# Patient Record
Sex: MALE | Race: BLACK OR AFRICAN AMERICAN | NOT HISPANIC OR LATINO | Employment: OTHER | URBAN - METROPOLITAN AREA
[De-identification: names, ages, dates, MRNs, and addresses within clinical notes are randomized per-mention and may not be internally consistent; named-entity substitution may affect disease eponyms.]

---

## 2023-03-12 ENCOUNTER — HOSPITAL ENCOUNTER (EMERGENCY)
Facility: HOSPITAL | Age: 63
Discharge: HOME | End: 2023-03-12
Attending: EMERGENCY MEDICINE | Admitting: EMERGENCY MEDICINE
Payer: MEDICARE

## 2023-03-12 ENCOUNTER — APPOINTMENT (OUTPATIENT)
Dept: CT IMAGING | Facility: HOSPITAL | Age: 63
End: 2023-03-12
Payer: MEDICARE

## 2023-03-12 VITALS
BODY MASS INDEX: 32.14 KG/M2 | RESPIRATION RATE: 18 BRPM | TEMPERATURE: 97 F | HEIGHT: 66 IN | DIASTOLIC BLOOD PRESSURE: 103 MMHG | WEIGHT: 200 LBS | SYSTOLIC BLOOD PRESSURE: 143 MMHG | OXYGEN SATURATION: 100 % | HEART RATE: 90 BPM

## 2023-03-12 DIAGNOSIS — N18.9 CHRONIC KIDNEY DISEASE, UNSPECIFIED CKD STAGE: ICD-10-CM
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
## 2023-03-12 DIAGNOSIS — S09.90XA INJURY OF HEAD, INITIAL ENCOUNTER: Primary | ICD-10-CM

## 2023-03-12 LAB
ALBUMIN SERPL-MCNC: 3.9 G/DL (ref 3.5–5.2)
ALBUMIN/GLOB SERPL: 1.2 G/DL
ALP SERPL-CCNC: 234 U/L (ref 39–117)
ALT SERPL W P-5'-P-CCNC: 105 U/L (ref 1–41)
ANION GAP SERPL CALCULATED.3IONS-SCNC: 15 MMOL/L (ref 5–15)
APTT PPP: 30.3 SECONDS (ref 22.7–35.4)
AST SERPL-CCNC: 104 U/L (ref 1–40)
BASOPHILS # BLD AUTO: 0.04 10*3/MM3 (ref 0–0.2)
BASOPHILS NFR BLD AUTO: 0.7 % (ref 0–1.5)
BILIRUB SERPL-MCNC: 0.8 MG/DL (ref 0–1.2)
BUN SERPL-MCNC: 49 MG/DL (ref 8–23)
BUN/CREAT SERPL: 12.8 (ref 7–25)
CALCIUM SPEC-SCNC: 9.6 MG/DL (ref 8.6–10.5)
CHLORIDE SERPL-SCNC: 104 MMOL/L (ref 98–107)
CO2 SERPL-SCNC: 19 MMOL/L (ref 22–29)
CREAT SERPL-MCNC: 3.84 MG/DL (ref 0.76–1.27)
DEPRECATED RDW RBC AUTO: 53.4 FL (ref 37–54)
EGFRCR SERPLBLD CKD-EPI 2021: 16.6 ML/MIN/1.73
EOSINOPHIL # BLD AUTO: 0.02 10*3/MM3 (ref 0–0.4)
EOSINOPHIL NFR BLD AUTO: 0.3 % (ref 0.3–6.2)
ERYTHROCYTE [DISTWIDTH] IN BLOOD BY AUTOMATED COUNT: 15.8 % (ref 12.3–15.4)
GLOBULIN UR ELPH-MCNC: 3.2 GM/DL
GLUCOSE BLDC GLUCOMTR-MCNC: 112 MG/DL (ref 70–130)
GLUCOSE SERPL-MCNC: 109 MG/DL (ref 65–99)
HCT VFR BLD AUTO: 41.6 % (ref 37.5–51)
HGB BLD-MCNC: 13.3 G/DL (ref 13–17.7)
IMM GRANULOCYTES # BLD AUTO: 0.02 10*3/MM3 (ref 0–0.05)
IMM GRANULOCYTES NFR BLD AUTO: 0.3 % (ref 0–0.5)
INR PPP: 2.09 (ref 0.9–1.1)
LYMPHOCYTES # BLD AUTO: 1.03 10*3/MM3 (ref 0.7–3.1)
LYMPHOCYTES NFR BLD AUTO: 17.3 % (ref 19.6–45.3)
MCH RBC QN AUTO: 29.9 PG (ref 26.6–33)
MCHC RBC AUTO-ENTMCNC: 32 G/DL (ref 31.5–35.7)
MCV RBC AUTO: 93.5 FL (ref 79–97)
MONOCYTES # BLD AUTO: 0.35 10*3/MM3 (ref 0.1–0.9)
MONOCYTES NFR BLD AUTO: 5.9 % (ref 5–12)
NEUTROPHILS NFR BLD AUTO: 4.5 10*3/MM3 (ref 1.7–7)
NEUTROPHILS NFR BLD AUTO: 75.5 % (ref 42.7–76)
NRBC BLD AUTO-RTO: 0.3 /100 WBC (ref 0–0.2)
PLATELET # BLD AUTO: 222 10*3/MM3 (ref 140–450)
PMV BLD AUTO: 10.5 FL (ref 6–12)
POTASSIUM SERPL-SCNC: 5.2 MMOL/L (ref 3.5–5.2)
PROT SERPL-MCNC: 7.1 G/DL (ref 6–8.5)
PROTHROMBIN TIME: 23.7 SECONDS (ref 11.7–14.2)
RBC # BLD AUTO: 4.45 10*6/MM3 (ref 4.14–5.8)
SODIUM SERPL-SCNC: 138 MMOL/L (ref 136–145)
WBC NRBC COR # BLD: 5.96 10*3/MM3 (ref 3.4–10.8)

## 2023-03-12 PROCEDURE — 99283 EMERGENCY DEPT VISIT LOW MDM: CPT

## 2023-03-12 PROCEDURE — 85025 COMPLETE CBC W/AUTO DIFF WBC: CPT | Performed by: EMERGENCY MEDICINE

## 2023-03-12 PROCEDURE — 70450 CT HEAD/BRAIN W/O DYE: CPT

## 2023-03-12 PROCEDURE — 82962 GLUCOSE BLOOD TEST: CPT

## 2023-03-12 PROCEDURE — 72125 CT NECK SPINE W/O DYE: CPT

## 2023-03-12 PROCEDURE — 36415 COLL VENOUS BLD VENIPUNCTURE: CPT

## 2023-03-12 PROCEDURE — 80053 COMPREHEN METABOLIC PANEL: CPT | Performed by: EMERGENCY MEDICINE

## 2023-03-12 PROCEDURE — 85610 PROTHROMBIN TIME: CPT | Performed by: EMERGENCY MEDICINE

## 2023-03-12 PROCEDURE — 85730 THROMBOPLASTIN TIME PARTIAL: CPT | Performed by: EMERGENCY MEDICINE

## 2023-03-12 NOTE — ED TRIAGE NOTES
Pt from [facility] post acute for fall with head injury. Pt slid out of his be and hit his head on his chair on the right side. Now c/o of HA. Pt on blood thinners, no LOC.     Pt and RN wearing mask upon triage.

## 2023-03-12 NOTE — ED PROVIDER NOTES
EMERGENCY DEPARTMENT ENCOUNTER    Room Number:  33/33  Date seen:  3/12/2023  PCP: Rigo De Los Santos MD  Historian: Patient, paramedics      HPI:  Chief Complaint: Accidental fall with head injury and some confusion  A complete HPI/ROS/PMH/PSH/SH/FH are unobtainable due to: N/A  Context: Seth Marie is a 63 y.o. male who presents to the ED via EMS from nursing home facility for evaluation of accidental fall from bed with head injury.  Patient tells me that he was reaching for a remote control when he excellently fell out of bed and struck his head against the ground and chair.  He does not think he had loss of consciousness.  He complains of some pain along the left-sided head area.  Patient is reportedly on an anticoagulant but it is not clear which one right now.  Paramedics also report patient had some signs of confusion as reported by the nursing home staff.  Patient denies any other areas of injury or concern to me.        PAST MEDICAL HISTORY  Active Ambulatory Problems     Diagnosis Date Noted   • No Active Ambulatory Problems     Resolved Ambulatory Problems     Diagnosis Date Noted   • No Resolved Ambulatory Problems     No Additional Past Medical History         PAST SURGICAL HISTORY  History reviewed. No pertinent surgical history.      FAMILY HISTORY  History reviewed. No pertinent family history.      SOCIAL HISTORY  Social History     Socioeconomic History   • Marital status: Single         ALLERGIES  Pork-derived products and Protonix [pantoprazole]        REVIEW OF SYSTEMS  Review of Systems   Constitutional: Negative for activity change and fever.   Eyes: Negative for pain and visual disturbance.   Respiratory: Negative for cough and shortness of breath.    Cardiovascular: Negative for chest pain.   Gastrointestinal: Negative for abdominal pain.   Genitourinary: Negative for dysuria.   Skin: Negative for color change.   Neurological: Positive for headaches. Negative for syncope.    Psychiatric/Behavioral: Positive for confusion.   All other systems reviewed and are negative.           PHYSICAL EXAM  ED Triage Vitals   Temp Heart Rate Resp BP SpO2   03/12/23 1548 03/12/23 1548 03/12/23 1548 03/12/23 1548 03/12/23 1548   97 °F (36.1 °C) 96 18 134/100 98 %      Temp src Heart Rate Source Patient Position BP Location FiO2 (%)   -- -- 03/12/23 1610 03/12/23 1610 --     Lying Left arm        Physical Exam      GENERAL: Pleasant, older gentleman, calm, no acute distress  HENT: nares patent, normocephalic.  No obvious scalp hematoma evident and certainly no scalp laceration.  There is some mild tenderness to the left parietal scalp and also to the right parietal scalp to a lesser degree.  EYES: no scleral icterus, normal conjunctiva  CV: regular rhythm, normal rate, normal pulses  RESPIRATORY: normal effort, no stridor  ABDOMEN: soft, nontender throughout  MUSCULOSKELETAL: no deformity, no sign of injury to the extremities  NEURO: alert, moves all extremities, follows commands  PSYCH:  calm, cooperative  SKIN: warm, dry    Vital signs and nursing notes reviewed.          LAB RESULTS  Recent Results (from the past 24 hour(s))   POC Glucose Once    Collection Time: 03/12/23  4:55 PM    Specimen: Blood   Result Value Ref Range    Glucose 112 70 - 130 mg/dL   Comprehensive Metabolic Panel    Collection Time: 03/12/23  6:09 PM    Specimen: Blood   Result Value Ref Range    Glucose 109 (H) 65 - 99 mg/dL    BUN 49 (H) 8 - 23 mg/dL    Creatinine 3.84 (H) 0.76 - 1.27 mg/dL    Sodium 138 136 - 145 mmol/L    Potassium 5.2 3.5 - 5.2 mmol/L    Chloride 104 98 - 107 mmol/L    CO2 19.0 (L) 22.0 - 29.0 mmol/L    Calcium 9.6 8.6 - 10.5 mg/dL    Total Protein 7.1 6.0 - 8.5 g/dL    Albumin 3.9 3.5 - 5.2 g/dL    ALT (SGPT) 105 (H) 1 - 41 U/L    AST (SGOT) 104 (H) 1 - 40 U/L    Alkaline Phosphatase 234 (H) 39 - 117 U/L    Total Bilirubin 0.8 0.0 - 1.2 mg/dL    Globulin 3.2 gm/dL    A/G Ratio 1.2 g/dL    BUN/Creatinine  Ratio 12.8 7.0 - 25.0    Anion Gap 15.0 5.0 - 15.0 mmol/L    eGFR 16.6 (L) >60.0 mL/min/1.73   CBC Auto Differential    Collection Time: 03/12/23  6:09 PM    Specimen: Blood   Result Value Ref Range    WBC 5.96 3.40 - 10.80 10*3/mm3    RBC 4.45 4.14 - 5.80 10*6/mm3    Hemoglobin 13.3 13.0 - 17.7 g/dL    Hematocrit 41.6 37.5 - 51.0 %    MCV 93.5 79.0 - 97.0 fL    MCH 29.9 26.6 - 33.0 pg    MCHC 32.0 31.5 - 35.7 g/dL    RDW 15.8 (H) 12.3 - 15.4 %    RDW-SD 53.4 37.0 - 54.0 fl    MPV 10.5 6.0 - 12.0 fL    Platelets 222 140 - 450 10*3/mm3    Neutrophil % 75.5 42.7 - 76.0 %    Lymphocyte % 17.3 (L) 19.6 - 45.3 %    Monocyte % 5.9 5.0 - 12.0 %    Eosinophil % 0.3 0.3 - 6.2 %    Basophil % 0.7 0.0 - 1.5 %    Immature Grans % 0.3 0.0 - 0.5 %    Neutrophils, Absolute 4.50 1.70 - 7.00 10*3/mm3    Lymphocytes, Absolute 1.03 0.70 - 3.10 10*3/mm3    Monocytes, Absolute 0.35 0.10 - 0.90 10*3/mm3    Eosinophils, Absolute 0.02 0.00 - 0.40 10*3/mm3    Basophils, Absolute 0.04 0.00 - 0.20 10*3/mm3    Immature Grans, Absolute 0.02 0.00 - 0.05 10*3/mm3    nRBC 0.3 (H) 0.0 - 0.2 /100 WBC       Ordered the above labs and reviewed the results.        RADIOLOGY  CT Head Without Contrast    Result Date: 3/12/2023  Patient: TRACY SOLORZANO  Time Out: 19:15 Exam(s): CT HEAD Without Contrast EXAM:   CT Head Without Intravenous Contrast CLINICAL HISTORY:    Reason for exam: fall, head injury. TECHNIQUE:   Axial computed tomography images of the head brain without intravenous contrast.  CTDI is 55.7 mGy and DLP is 929.6 mGy-cm.  This CT exam was performed according to the principle of ALARA (As Low As Reasonably Achievable) by using one or more of the following dose reduction techniques: automated exposure control, adjustment of the mA and or kV according to patient size, and or use of iterative reconstruction technique. COMPARISON:   No relevant prior studies available. FINDINGS:   Brain: Mild volume loss and microvascular changes are present.   No intracranial hemorrhage.  No edema.   Ventricles:  Unremarkable.  No ventriculomegaly.   Bones joints:  Unremarkable.  No acute fracture.   Soft tissues:  Unremarkable.   Sinuses:  Unremarkable as visualized.  No acute sinusitis.   Mastoid air cells:  Unremarkable as visualized.  No mastoid effusion. IMPRESSION:     Volume loss and microvascular changes are present.  No intracranial hemorrhage.    Electronically signed by Florentino Farrell D.O. on 03-12-23 at 1915    CT Cervical Spine Without Contrast    Result Date: 3/12/2023  Patient: TRACY SOLORZANO  Time Out: 19:21 Exam(s): CT C SPINE EXAM:   CT Cervical Spine Without Intravenous Contrast CLINICAL HISTORY:    Reason for exam: fall, head injury. TECHNIQUE:   Axial computed tomography images of the cervical spine without intravenous contrast.  CTDI is 17.78 mGy and DLP is 303.7 mGy-cm.  This CT exam was performed according to the principle of ALARA (As Low As Reasonably Achievable) by using one or more of the following dose reduction techniques: automated exposure control, adjustment of the mA and or kV according to patient size, and or use of iterative reconstruction technique. COMPARISON:   No relevant prior studies available. FINDINGS:   Vertebrae:  Unremarkable.  No acute fracture.   Discs spinal canal neural foramina:  No acute findings.  Moderate to advanced degenerative disc these present.  Mild central canal stenosis present.   Soft tissues:  Unremarkable. IMPRESSION:     Degenerative disc disease present, but no fracture    Electronically signed by Florentino Farrell D.O. on 03-12-23 at 1921      Ordered the above noted radiological studies. Reviewed by me in PACS.            PROCEDURES  Procedures        MEDICATIONS GIVEN IN ER  Medications - No data to display                MEDICAL DECISION MAKING, PROGRESS, and CONSULTS    All labs have been independently reviewed by me.  All radiology studies have been reviewed by me and I have also reviewed the radiology  report.   EKG's independently viewed and interpreted by me.  Discussion below represents my analysis of pertinent findings related to patient's condition, differential diagnosis, treatment plan and final disposition.      Additional sources:  - Discussed/ obtained information from independent historians: Discussed with paramedics to receive report from them at arrival    - External (non-ED) record review: No records on file for review    - Chronic or social conditions impacting care: Currently staying in a nursing home facility for care needs.        Orders placed during this visit:  Orders Placed This Encounter   Procedures   • CT Head Without Contrast   • CT Cervical Spine Without Contrast   • Comprehensive Metabolic Panel   • Protime-INR   • aPTT   • CBC Auto Differential   • POC Glucose Once   • CBC & Differential           Differential diagnosis:    Concussion, subdural hematoma, subarachnoid hemorrhage, skull fracture      Independent interpretation of labs, radiology studies, and discussions with consultants:  ED Course as of 03/12/23 1931   Sun Mar 12, 2023   1655 Patient is awake and alert and normally conversational with me but he does show some subtle signs of confusion.  Of course, I do not know exactly what his typical functional neurologic baseline is with regard to conversation and orientation.  We will get a head CT and CT of the cervical spine while also checking some basic labs such as coagulation profile and CMP.  His point-of-care glucose is 112 and therefore I do not think hypoglycemia is contributing to his reported mental status confusion. [KP]   1840 Creatinine(!): 3.84  I do not have old records or labs for comparison but I do see on his paperwork that he has history of chronic kidney disease stage IV. [KP]   1930 I reviewed the labs and CT report from today's visit.  Thankfully there does not appear to be any intracranial injury or C-spine fracture injury evident.  Patient has been resting  "comfortably here throughout this entire visit.  He does show some signs of confusion at times.  I presume this may be his functional baseline.  Review of the nursing home records does indicate description of cognitive communication deficit as well as adjustment disorder with mixed disturbance of emotions and conduct.  I do not think there is any reason that we need to admit him in the hospital at this point in time.  I think he is safe to return back to the [facility] postacute care [facility] for continued therapies.  Will list the usual \"return to ER\" instructions in his discharge paperwork. [KP]      ED Course User Index  [KP] Florentino Loo MD             Patient was placed in face mask during triage.  Patient was wearing face mask throughout encounter.  I wore personal protective equipment throughout the encounter.  Hand hygiene was performed before and after patient encounter.     DIAGNOSIS  Final diagnoses:   Injury of head, initial encounter   Accidental fall from bed, initial encounter   Chronic kidney disease, unspecified CKD stage         DISPOSITION  Discharge back to nursing home            Latest Documented Vital Signs:  As of 19:31 EDT  BP- (!) 135/106 HR- 80 Temp- 97 °F (36.1 °C) O2 sat- 100%              --    Please note that portions of this were completed with a voice recognition program.       Note Disclaimer: At [facility], we believe that sharing information builds trust and better relationships. You are receiving this note because you are receiving care at [facility] or recently visited. It is possible you will see health information before a provider has talked with you about it. This kind of information can be easy to misunderstand. To help you fully understand what it means for your health, we urge you to discuss this note with your provider.           Florentino Loo MD  03/12/23 1932    "

## 2023-03-13 NOTE — CASE MANAGEMENT/SOCIAL WORK
Continued Stay Note  [facility]     Patient Name: Seth Marie  MRN: [id number]  Today's Date: 3/12/2023    Admit Date: 3/12/2023        Discharge Plan     Row Name 03/12/23 2101       Plan    Plan Comments [facility] informed of need to transport back to facility. Unknown ETA               Discharge Codes    No documentation.                     Cornelia Cr RN

## 2023-04-22 ENCOUNTER — HOSPITAL ENCOUNTER (INPATIENT)
Facility: HOSPITAL | Age: 63
LOS: 2 days | Discharge: HOSPICE | End: 2023-04-25
Attending: EMERGENCY MEDICINE | Admitting: INTERNAL MEDICINE
Payer: MEDICARE

## 2023-04-22 ENCOUNTER — APPOINTMENT (OUTPATIENT)
Dept: GENERAL RADIOLOGY | Facility: HOSPITAL | Age: 63
End: 2023-04-22
Payer: MEDICARE

## 2023-04-22 ENCOUNTER — APPOINTMENT (OUTPATIENT)
Dept: ULTRASOUND IMAGING | Facility: HOSPITAL | Age: 63
End: 2023-04-22
Payer: MEDICARE

## 2023-04-22 DIAGNOSIS — R06.00 DYSPNEA, UNSPECIFIED TYPE: ICD-10-CM

## 2023-04-22 DIAGNOSIS — J18.9 MULTIFOCAL PNEUMONIA: Primary | ICD-10-CM

## 2023-04-22 DIAGNOSIS — N18.4 STAGE 4 CHRONIC KIDNEY DISEASE: ICD-10-CM

## 2023-04-22 LAB
ALBUMIN SERPL-MCNC: 3.5 G/DL (ref 3.5–5.2)
ALBUMIN/GLOB SERPL: 0.9 G/DL
ALP SERPL-CCNC: 265 U/L (ref 39–117)
ALT SERPL W P-5'-P-CCNC: 33 U/L (ref 1–41)
ANION GAP SERPL CALCULATED.3IONS-SCNC: 18 MMOL/L (ref 5–15)
AST SERPL-CCNC: 52 U/L (ref 1–40)
B PARAPERT DNA SPEC QL NAA+PROBE: NOT DETECTED
B PERT DNA SPEC QL NAA+PROBE: NOT DETECTED
BACTERIA UR QL AUTO: ABNORMAL /HPF
BASOPHILS # BLD AUTO: 0 10*3/MM3 (ref 0–0.2)
BASOPHILS NFR BLD AUTO: 0.5 % (ref 0–1.5)
BILIRUB SERPL-MCNC: 1.9 MG/DL (ref 0–1.2)
BILIRUB UR QL STRIP: NEGATIVE
BUN SERPL-MCNC: 76 MG/DL (ref 8–23)
BUN/CREAT SERPL: 21.5 (ref 7–25)
C PNEUM DNA NPH QL NAA+NON-PROBE: NOT DETECTED
CALCIUM SPEC-SCNC: 10.4 MG/DL (ref 8.6–10.5)
CHLORIDE SERPL-SCNC: 106 MMOL/L (ref 98–107)
CHLORIDE UR-SCNC: 32 MMOL/L
CLARITY UR: CLEAR
CO2 SERPL-SCNC: 22 MMOL/L (ref 22–29)
COLOR UR: YELLOW
CREAT SERPL-MCNC: 3.54 MG/DL (ref 0.76–1.27)
CREAT UR-MCNC: 97 MG/DL
D-LACTATE SERPL-SCNC: 1.6 MMOL/L (ref 0.3–2)
DEPRECATED RDW RBC AUTO: 66.1 FL (ref 37–54)
EGFRCR SERPLBLD CKD-EPI 2021: 18.3 ML/MIN/1.73
EOSINOPHIL # BLD AUTO: 0 10*3/MM3 (ref 0–0.4)
EOSINOPHIL NFR BLD AUTO: 0.2 % (ref 0.3–6.2)
ERYTHROCYTE [DISTWIDTH] IN BLOOD BY AUTOMATED COUNT: 19.4 % (ref 12.3–15.4)
FLUAV SUBTYP SPEC NAA+PROBE: NOT DETECTED
FLUBV RNA ISLT QL NAA+PROBE: NOT DETECTED
GLOBULIN UR ELPH-MCNC: 4 GM/DL
GLUCOSE SERPL-MCNC: 107 MG/DL (ref 65–99)
GLUCOSE UR STRIP-MCNC: NEGATIVE MG/DL
HADV DNA SPEC NAA+PROBE: NOT DETECTED
HCOV 229E RNA SPEC QL NAA+PROBE: NOT DETECTED
HCOV HKU1 RNA SPEC QL NAA+PROBE: NOT DETECTED
HCOV NL63 RNA SPEC QL NAA+PROBE: NOT DETECTED
HCOV OC43 RNA SPEC QL NAA+PROBE: NOT DETECTED
HCT VFR BLD AUTO: 45.4 % (ref 37.5–51)
HGB BLD-MCNC: 14.5 G/DL (ref 13–17.7)
HGB UR QL STRIP.AUTO: NEGATIVE
HMPV RNA NPH QL NAA+NON-PROBE: NOT DETECTED
HOLD SPECIMEN: NORMAL
HOLD SPECIMEN: NORMAL
HPIV1 RNA ISLT QL NAA+PROBE: NOT DETECTED
HPIV2 RNA SPEC QL NAA+PROBE: NOT DETECTED
HPIV3 RNA NPH QL NAA+PROBE: NOT DETECTED
HPIV4 P GENE NPH QL NAA+PROBE: NOT DETECTED
HYALINE CASTS UR QL AUTO: ABNORMAL /LPF
KETONES UR QL STRIP: NEGATIVE
L PNEUMO1 AG UR QL IA: NEGATIVE
LEUKOCYTE ESTERASE UR QL STRIP.AUTO: NEGATIVE
LYMPHOCYTES # BLD AUTO: 0.6 10*3/MM3 (ref 0.7–3.1)
LYMPHOCYTES NFR BLD AUTO: 12.8 % (ref 19.6–45.3)
M PNEUMO IGG SER IA-ACNC: NOT DETECTED
MAGNESIUM SERPL-MCNC: 2.2 MG/DL (ref 1.6–2.4)
MCH RBC QN AUTO: 29.3 PG (ref 26.6–33)
MCHC RBC AUTO-ENTMCNC: 32 G/DL (ref 31.5–35.7)
MCV RBC AUTO: 91.7 FL (ref 79–97)
MONOCYTES # BLD AUTO: 0.3 10*3/MM3 (ref 0.1–0.9)
MONOCYTES NFR BLD AUTO: 5.2 % (ref 5–12)
NEUTROPHILS NFR BLD AUTO: 4 10*3/MM3 (ref 1.7–7)
NEUTROPHILS NFR BLD AUTO: 81.3 % (ref 42.7–76)
NITRITE UR QL STRIP: NEGATIVE
NRBC BLD AUTO-RTO: 0.9 /100 WBC (ref 0–0.2)
NT-PROBNP SERPL-MCNC: ABNORMAL PG/ML (ref 0–900)
PH UR STRIP.AUTO: 5.5 [PH] (ref 5–8)
PHOSPHATE SERPL-MCNC: 3.1 MG/DL (ref 2.5–4.5)
PLATELET # BLD AUTO: 169 10*3/MM3 (ref 140–450)
PMV BLD AUTO: 9.6 FL (ref 6–12)
POTASSIUM SERPL-SCNC: 4.5 MMOL/L (ref 3.5–5.2)
PROT ?TM UR-MCNC: 145.1 MG/DL
PROT SERPL-MCNC: 7.5 G/DL (ref 6–8.5)
PROT UR QL STRIP: ABNORMAL
QT INTERVAL: 433 MS
RBC # BLD AUTO: 4.94 10*6/MM3 (ref 4.14–5.8)
RBC # UR STRIP: ABNORMAL /HPF
REF LAB TEST METHOD: ABNORMAL
RHINOVIRUS RNA SPEC NAA+PROBE: NOT DETECTED
RSV RNA NPH QL NAA+NON-PROBE: NOT DETECTED
S PNEUM AG SPEC QL LA: NEGATIVE
SARS-COV-2 RNA NPH QL NAA+NON-PROBE: NOT DETECTED
SODIUM SERPL-SCNC: 146 MMOL/L (ref 136–145)
SODIUM UR-SCNC: 52 MMOL/L
SP GR UR STRIP: 1.01 (ref 1–1.03)
SQUAMOUS #/AREA URNS HPF: ABNORMAL /HPF
TROPONIN T SERPL HS-MCNC: 155 NG/L
UROBILINOGEN UR QL STRIP: ABNORMAL
WBC # UR STRIP: ABNORMAL /HPF
WBC NRBC COR # BLD: 5 10*3/MM3 (ref 3.4–10.8)
WHOLE BLOOD HOLD COAG: NORMAL
WHOLE BLOOD HOLD SPECIMEN: NORMAL

## 2023-04-22 PROCEDURE — 84484 ASSAY OF TROPONIN QUANT: CPT | Performed by: PHYSICIAN ASSISTANT

## 2023-04-22 PROCEDURE — 99223 1ST HOSP IP/OBS HIGH 75: CPT | Performed by: INTERNAL MEDICINE

## 2023-04-22 PROCEDURE — 87449 NOS EACH ORGANISM AG IA: CPT | Performed by: NURSE PRACTITIONER

## 2023-04-22 PROCEDURE — 80053 COMPREHEN METABOLIC PANEL: CPT | Performed by: PHYSICIAN ASSISTANT

## 2023-04-22 PROCEDURE — G0378 HOSPITAL OBSERVATION PER HR: HCPCS

## 2023-04-22 PROCEDURE — 81001 URINALYSIS AUTO W/SCOPE: CPT | Performed by: INTERNAL MEDICINE

## 2023-04-22 PROCEDURE — 84156 ASSAY OF PROTEIN URINE: CPT | Performed by: INTERNAL MEDICINE

## 2023-04-22 PROCEDURE — 84300 ASSAY OF URINE SODIUM: CPT | Performed by: INTERNAL MEDICINE

## 2023-04-22 PROCEDURE — 76775 US EXAM ABDO BACK WALL LIM: CPT

## 2023-04-22 PROCEDURE — 93005 ELECTROCARDIOGRAM TRACING: CPT

## 2023-04-22 PROCEDURE — 87040 BLOOD CULTURE FOR BACTERIA: CPT | Performed by: PHYSICIAN ASSISTANT

## 2023-04-22 PROCEDURE — 71045 X-RAY EXAM CHEST 1 VIEW: CPT

## 2023-04-22 PROCEDURE — 83735 ASSAY OF MAGNESIUM: CPT | Performed by: INTERNAL MEDICINE

## 2023-04-22 PROCEDURE — 93005 ELECTROCARDIOGRAM TRACING: CPT | Performed by: EMERGENCY MEDICINE

## 2023-04-22 PROCEDURE — 84100 ASSAY OF PHOSPHORUS: CPT | Performed by: INTERNAL MEDICINE

## 2023-04-22 PROCEDURE — 36415 COLL VENOUS BLD VENIPUNCTURE: CPT | Performed by: PHYSICIAN ASSISTANT

## 2023-04-22 PROCEDURE — 83605 ASSAY OF LACTIC ACID: CPT

## 2023-04-22 PROCEDURE — 82570 ASSAY OF URINE CREATININE: CPT | Performed by: INTERNAL MEDICINE

## 2023-04-22 PROCEDURE — 85025 COMPLETE CBC W/AUTO DIFF WBC: CPT | Performed by: PHYSICIAN ASSISTANT

## 2023-04-22 PROCEDURE — 0202U NFCT DS 22 TRGT SARS-COV-2: CPT | Performed by: NURSE PRACTITIONER

## 2023-04-22 PROCEDURE — 82436 ASSAY OF URINE CHLORIDE: CPT | Performed by: INTERNAL MEDICINE

## 2023-04-22 PROCEDURE — 25010000002 CEFTRIAXONE PER 250 MG: Performed by: PHYSICIAN ASSISTANT

## 2023-04-22 PROCEDURE — 83880 ASSAY OF NATRIURETIC PEPTIDE: CPT | Performed by: PHYSICIAN ASSISTANT

## 2023-04-22 PROCEDURE — 99285 EMERGENCY DEPT VISIT HI MDM: CPT

## 2023-04-22 PROCEDURE — 25010000002 AZITHROMYCIN PER 500 MG: Performed by: NURSE PRACTITIONER

## 2023-04-22 RX ORDER — TRAZODONE HYDROCHLORIDE 50 MG/1
50 TABLET ORAL NIGHTLY
COMMUNITY

## 2023-04-22 RX ORDER — SODIUM CHLORIDE 0.9 % (FLUSH) 0.9 %
10 SYRINGE (ML) INJECTION AS NEEDED
Status: DISCONTINUED | OUTPATIENT
Start: 2023-04-22 | End: 2023-04-25 | Stop reason: HOSPADM

## 2023-04-22 RX ORDER — MELATONIN
2000 DAILY
COMMUNITY
End: 2023-04-25 | Stop reason: HOSPADM

## 2023-04-22 RX ORDER — TAMSULOSIN HYDROCHLORIDE 0.4 MG/1
2 CAPSULE ORAL NIGHTLY
COMMUNITY
End: 2023-04-25 | Stop reason: HOSPADM

## 2023-04-22 RX ORDER — POLYETHYLENE GLYCOL 3350 17 G/17G
17 POWDER, FOR SOLUTION ORAL DAILY
Status: DISCONTINUED | OUTPATIENT
Start: 2023-04-22 | End: 2023-04-25 | Stop reason: HOSPADM

## 2023-04-22 RX ORDER — CHOLECALCIFEROL (VITAMIN D3) 125 MCG
5 CAPSULE ORAL NIGHTLY PRN
Status: DISCONTINUED | OUTPATIENT
Start: 2023-04-22 | End: 2023-04-25 | Stop reason: HOSPADM

## 2023-04-22 RX ORDER — TRAZODONE HYDROCHLORIDE 50 MG/1
50 TABLET ORAL NIGHTLY
Status: DISCONTINUED | OUTPATIENT
Start: 2023-04-22 | End: 2023-04-25 | Stop reason: HOSPADM

## 2023-04-22 RX ORDER — MAGNESIUM SULFATE HEPTAHYDRATE 40 MG/ML
2 INJECTION, SOLUTION INTRAVENOUS AS NEEDED
Status: DISCONTINUED | OUTPATIENT
Start: 2023-04-22 | End: 2023-04-25 | Stop reason: HOSPADM

## 2023-04-22 RX ORDER — FUROSEMIDE 40 MG/1
40 TABLET ORAL DAILY
COMMUNITY
End: 2023-04-25 | Stop reason: HOSPADM

## 2023-04-22 RX ORDER — FERROUS SULFATE 325(65) MG
325 TABLET ORAL
COMMUNITY

## 2023-04-22 RX ORDER — ALBUTEROL SULFATE 90 UG/1
2 AEROSOL, METERED RESPIRATORY (INHALATION) EVERY 4 HOURS PRN
COMMUNITY

## 2023-04-22 RX ORDER — FOLIC ACID 1 MG/1
1 TABLET ORAL DAILY
Status: DISCONTINUED | OUTPATIENT
Start: 2023-04-23 | End: 2023-04-25 | Stop reason: HOSPADM

## 2023-04-22 RX ORDER — BUMETANIDE 0.25 MG/ML
1 INJECTION INTRAMUSCULAR; INTRAVENOUS ONCE
Status: COMPLETED | OUTPATIENT
Start: 2023-04-22 | End: 2023-04-22

## 2023-04-22 RX ORDER — NITROGLYCERIN 400 UG/1
1 SPRAY ORAL
COMMUNITY
End: 2023-04-25 | Stop reason: HOSPADM

## 2023-04-22 RX ORDER — SODIUM CHLORIDE 0.9 % (FLUSH) 0.9 %
10 SYRINGE (ML) INJECTION EVERY 12 HOURS SCHEDULED
Status: DISCONTINUED | OUTPATIENT
Start: 2023-04-22 | End: 2023-04-25 | Stop reason: HOSPADM

## 2023-04-22 RX ORDER — ISOSORBIDE DINITRATE 10 MG/1
10 TABLET ORAL 3 TIMES DAILY
COMMUNITY
End: 2023-04-25 | Stop reason: HOSPADM

## 2023-04-22 RX ORDER — SODIUM CHLORIDE 9 MG/ML
40 INJECTION, SOLUTION INTRAVENOUS AS NEEDED
Status: DISCONTINUED | OUTPATIENT
Start: 2023-04-22 | End: 2023-04-25 | Stop reason: HOSPADM

## 2023-04-22 RX ORDER — FERROUS SULFATE TAB EC 324 MG (65 MG FE EQUIVALENT) 324 (65 FE) MG
324 TABLET DELAYED RESPONSE ORAL
Status: DISCONTINUED | OUTPATIENT
Start: 2023-04-23 | End: 2023-04-25 | Stop reason: HOSPADM

## 2023-04-22 RX ORDER — ACETAMINOPHEN 650 MG/1
650 SUPPOSITORY RECTAL EVERY 4 HOURS PRN
Status: DISCONTINUED | OUTPATIENT
Start: 2023-04-22 | End: 2023-04-25 | Stop reason: HOSPADM

## 2023-04-22 RX ORDER — ACETAMINOPHEN 160 MG/5ML
650 SOLUTION ORAL EVERY 4 HOURS PRN
Status: DISCONTINUED | OUTPATIENT
Start: 2023-04-22 | End: 2023-04-25 | Stop reason: HOSPADM

## 2023-04-22 RX ORDER — MEXILETINE HYDROCHLORIDE 150 MG/1
150 CAPSULE ORAL 3 TIMES DAILY
COMMUNITY

## 2023-04-22 RX ORDER — POLYETHYLENE GLYCOL 3350 17 G/17G
17 POWDER, FOR SOLUTION ORAL DAILY
COMMUNITY

## 2023-04-22 RX ORDER — MAGNESIUM SULFATE HEPTAHYDRATE 40 MG/ML
4 INJECTION, SOLUTION INTRAVENOUS AS NEEDED
Status: DISCONTINUED | OUTPATIENT
Start: 2023-04-22 | End: 2023-04-25 | Stop reason: HOSPADM

## 2023-04-22 RX ORDER — BUPROPION HYDROCHLORIDE 150 MG/1
150 TABLET, EXTENDED RELEASE ORAL DAILY
COMMUNITY

## 2023-04-22 RX ORDER — NITROGLYCERIN 0.4 MG/1
0.4 TABLET SUBLINGUAL
Status: DISCONTINUED | OUTPATIENT
Start: 2023-04-22 | End: 2023-04-25 | Stop reason: HOSPADM

## 2023-04-22 RX ORDER — HYDRALAZINE HYDROCHLORIDE 10 MG/1
10 TABLET, FILM COATED ORAL 3 TIMES DAILY
COMMUNITY
End: 2023-04-25 | Stop reason: HOSPADM

## 2023-04-22 RX ORDER — CARVEDILOL 6.25 MG/1
6.25 TABLET ORAL 2 TIMES DAILY WITH MEALS
COMMUNITY
End: 2023-04-25 | Stop reason: HOSPADM

## 2023-04-22 RX ORDER — FOLIC ACID 1 MG/1
1 TABLET ORAL DAILY
COMMUNITY

## 2023-04-22 RX ORDER — ATORVASTATIN CALCIUM 40 MG/1
80 TABLET, FILM COATED ORAL EVERY EVENING
Status: DISCONTINUED | OUTPATIENT
Start: 2023-04-22 | End: 2023-04-25 | Stop reason: HOSPADM

## 2023-04-22 RX ORDER — ALBUTEROL SULFATE 2.5 MG/3ML
2.5 SOLUTION RESPIRATORY (INHALATION)
Status: DISCONTINUED | OUTPATIENT
Start: 2023-04-22 | End: 2023-04-24

## 2023-04-22 RX ORDER — ACETAMINOPHEN 325 MG/1
650 TABLET ORAL EVERY 4 HOURS PRN
Status: DISCONTINUED | OUTPATIENT
Start: 2023-04-22 | End: 2023-04-25 | Stop reason: HOSPADM

## 2023-04-22 RX ORDER — POTASSIUM CHLORIDE 1.5 G/1.77G
40 POWDER, FOR SOLUTION ORAL AS NEEDED
Status: DISCONTINUED | OUTPATIENT
Start: 2023-04-22 | End: 2023-04-25 | Stop reason: HOSPADM

## 2023-04-22 RX ORDER — ATORVASTATIN CALCIUM 80 MG/1
80 TABLET, FILM COATED ORAL EVERY EVENING
COMMUNITY

## 2023-04-22 RX ORDER — FINASTERIDE 5 MG/1
5 TABLET, FILM COATED ORAL DAILY
COMMUNITY
End: 2023-04-25 | Stop reason: HOSPADM

## 2023-04-22 RX ORDER — OMEPRAZOLE 20 MG/1
20 CAPSULE, DELAYED RELEASE ORAL DAILY
COMMUNITY

## 2023-04-22 RX ORDER — TAMSULOSIN HYDROCHLORIDE 0.4 MG/1
0.8 CAPSULE ORAL NIGHTLY
Status: DISCONTINUED | OUTPATIENT
Start: 2023-04-22 | End: 2023-04-25 | Stop reason: HOSPADM

## 2023-04-22 RX ORDER — TRAMADOL HYDROCHLORIDE 50 MG/1
50 TABLET ORAL EVERY 6 HOURS PRN
Status: DISCONTINUED | OUTPATIENT
Start: 2023-04-22 | End: 2023-04-25 | Stop reason: HOSPADM

## 2023-04-22 RX ORDER — PANTOPRAZOLE SODIUM 40 MG/1
40 TABLET, DELAYED RELEASE ORAL
Status: DISCONTINUED | OUTPATIENT
Start: 2023-04-23 | End: 2023-04-25 | Stop reason: HOSPADM

## 2023-04-22 RX ORDER — ONDANSETRON 2 MG/ML
4 INJECTION INTRAMUSCULAR; INTRAVENOUS EVERY 6 HOURS PRN
Status: DISCONTINUED | OUTPATIENT
Start: 2023-04-22 | End: 2023-04-25 | Stop reason: HOSPADM

## 2023-04-22 RX ORDER — NALOXONE HYDROCHLORIDE 4 MG/.1ML
1 SPRAY NASAL AS NEEDED
COMMUNITY
End: 2023-04-25 | Stop reason: HOSPADM

## 2023-04-22 RX ORDER — POTASSIUM CHLORIDE 20 MEQ/1
40 TABLET, EXTENDED RELEASE ORAL AS NEEDED
Status: DISCONTINUED | OUTPATIENT
Start: 2023-04-22 | End: 2023-04-25 | Stop reason: HOSPADM

## 2023-04-22 RX ORDER — SODIUM BICARBONATE 650 MG/1
1300 TABLET ORAL 2 TIMES DAILY
COMMUNITY
End: 2023-04-25 | Stop reason: HOSPADM

## 2023-04-22 RX ORDER — ONDANSETRON 4 MG/1
4 TABLET, FILM COATED ORAL EVERY 6 HOURS PRN
Status: DISCONTINUED | OUTPATIENT
Start: 2023-04-22 | End: 2023-04-25 | Stop reason: HOSPADM

## 2023-04-22 RX ORDER — IPRATROPIUM BROMIDE AND ALBUTEROL SULFATE 2.5; .5 MG/3ML; MG/3ML
3 SOLUTION RESPIRATORY (INHALATION) EVERY 4 HOURS PRN
COMMUNITY
Start: 2023-04-21 | End: 2026-04-26

## 2023-04-22 RX ORDER — MEXILETINE HYDROCHLORIDE 150 MG/1
150 CAPSULE ORAL 3 TIMES DAILY
Status: DISCONTINUED | OUTPATIENT
Start: 2023-04-22 | End: 2023-04-25 | Stop reason: HOSPADM

## 2023-04-22 RX ORDER — HYDRALAZINE HYDROCHLORIDE 20 MG/ML
10 INJECTION INTRAMUSCULAR; INTRAVENOUS EVERY 6 HOURS PRN
Status: DISCONTINUED | OUTPATIENT
Start: 2023-04-22 | End: 2023-04-25 | Stop reason: HOSPADM

## 2023-04-22 RX ORDER — BUPROPION HYDROCHLORIDE 150 MG/1
150 TABLET, EXTENDED RELEASE ORAL DAILY
Status: DISCONTINUED | OUTPATIENT
Start: 2023-04-22 | End: 2023-04-25 | Stop reason: HOSPADM

## 2023-04-22 RX ORDER — LIDOCAINE 4 G/G
2 PATCH TOPICAL EVERY MORNING
COMMUNITY
End: 2023-04-25 | Stop reason: HOSPADM

## 2023-04-22 RX ADMIN — TRAZODONE HYDROCHLORIDE 50 MG: 50 TABLET ORAL at 21:17

## 2023-04-22 RX ADMIN — BUMETANIDE 1 MG: 0.25 INJECTION INTRAMUSCULAR; INTRAVENOUS at 11:39

## 2023-04-22 RX ADMIN — Medication 10 ML: at 21:17

## 2023-04-22 RX ADMIN — MEXILETINE HYDROCHLORIDE 150 MG: 150 CAPSULE ORAL at 21:17

## 2023-04-22 RX ADMIN — Medication 10 ML: at 11:40

## 2023-04-22 RX ADMIN — AZITHROMYCIN MONOHYDRATE 500 MG: 500 INJECTION, POWDER, LYOPHILIZED, FOR SOLUTION INTRAVENOUS at 11:48

## 2023-04-22 RX ADMIN — CEFTRIAXONE 2 G: 2 INJECTION, POWDER, FOR SOLUTION INTRAMUSCULAR; INTRAVENOUS at 08:53

## 2023-04-22 RX ADMIN — APIXABAN 5 MG: 5 TABLET, FILM COATED ORAL at 21:17

## 2023-04-22 RX ADMIN — TAMSULOSIN HYDROCHLORIDE 0.8 MG: 0.4 CAPSULE ORAL at 21:17
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
## 2023-04-22 NOTE — ED NOTES
Pt ECF called for pt medical information, call was disconnected four times after being transferred. ECF called for fifth time, spoke to DON, DON to call back with information once she walks to her office.   SHEELA Lemon notified

## 2023-04-22 NOTE — CONSULTS
RENAL/K[facility]:    Referring Provider: Dr. Christensen  Reason for Consultation: BRET/CKD    Subjective     Chief complaint: SOA    History of present illness:  Patient is a 64 yo AAM with h/o advanced CKD [facility]ollowed [facility].  He has progressive DZ and is admitted now with SOA - CHF exacerbation +/- PNA.  Cr was 2.8 when D/C'd from [facility] earlier this year.  It was as high as 3.8 in March and is 3.5 today.  ROS is limited.  He resides at [facility].  He appears to be having some dysuria and difficulty voiding.  Bladder scan showed over 200 cc.      History  Past Medical History:   Diagnosis Date   • Adjustment disorder    • Alcohol dependence    • Anxiety    • Atrial fibrillation    • CHF (congestive heart failure)    • CKD (chronic kidney disease) stage 4, GFR 15-29 ml/min    • Dementia    • Depression    • Elevated cholesterol    • Encephalopathy    • GERD (gastroesophageal reflux disease)    • Hepatitis C    • Hyperlipidemia    • Hypertension    ,   Past Surgical History:   Procedure Laterality Date   • PACEMAKER REPLACEMENT     , History reviewed. No pertinent family history.,   Social History     Socioeconomic History   • Marital status: Single   Tobacco Use   • Smoking status: Former     Types: Cigarettes   Vaping Use   • Vaping Use: Never used   Substance and Sexual Activity   • Alcohol use: Not Currently   • Drug use: Not Currently     Types: Heroin   • Sexual activity: Defer     E-cigarette/Vaping   • E-cigarette/Vaping Use Never User    • Passive Exposure No    • Counseling Given No      E-cigarette/Vaping Substances   • Nicotine No    • THC No    • CBD No    • Flavoring No      E-cigarette/Vaping Devices   • Disposable No    • Pre-filled or Refillable Cartridge No    • Refillable Tank No    • Pre-filled Pod No        ,   Medications Prior to Admission   Medication Sig Dispense Refill Last Dose   • albuterol sulfate  (90 Base) MCG/ACT inhaler Inhale 2 puffs Every 4 (Four) Hours As Needed  for Wheezing.      • apixaban (ELIQUIS) 5 MG tablet tablet Take 1 tablet by mouth 2 (Two) Times a Day.      • atorvastatin (LIPITOR) 80 MG tablet Take 1 tablet by mouth Every Evening.      • buPROPion SR (WELLBUTRIN SR) 150 MG 12 hr tablet Take 1 tablet by mouth Daily.      • carvedilol (COREG) 6.25 MG tablet Take 1 tablet by mouth 2 (Two) Times a Day With Meals.      • Cholecalciferol 25 MCG (1000 UT) tablet Take 2 tablets by mouth Daily.      • ferrous sulfate 325 (65 FE) MG tablet Take 1 tablet by mouth Daily With Breakfast.      • finasteride (PROSCAR) 5 MG tablet Take 1 tablet by mouth Daily.      • folic acid (FOLVITE) 1 MG tablet Take 1 tablet by mouth Daily.      • furosemide (LASIX) 40 MG tablet Take 1 tablet by mouth Daily.      • hydrALAZINE (APRESOLINE) 10 MG tablet Take 1 tablet by mouth 3 (Three) Times a Day.      • ipratropium-albuterol (DUO-NEB) 0.5-2.5 mg/3 ml nebulizer Take 3 mL by nebulization Every 4 (Four) Hours As Needed for Wheezing.      • isosorbide dinitrate (ISORDIL) 10 MG tablet Take 1 tablet by mouth 3 (Three) Times a Day.      • Lidocaine 4 % patch Apply 2 patches topically Every Morning.      • Menthol-Zinc Oxide (CALMOSEPTINE EX) Apply 1 application topically 2 (Two) Times a Day.      • mexiletine (MEXITIL) 150 MG capsule Take 1 capsule by mouth 3 (Three) Times a Day.      • naloxone (NARCAN) 4 MG/0.1ML nasal spray 1 spray into the nostril(s) as directed by provider As Needed.      • nitroglycerin (NITROLINGUAL) 0.4 MG/SPRAY spray Place 1 spray under the tongue Every 5 (Five) Minutes As Needed for Chest Pain.      • omeprazole (priLOSEC) 20 MG capsule Take 1 capsule by mouth Daily.      • polyethylene glycol (MIRALAX) 17 g packet Take 17 g by mouth Daily.      • sodium bicarbonate 650 MG tablet Take 2 tablets by mouth 2 (Two) Times a Day.      • tamsulosin (FLOMAX) 0.4 MG capsule 24 hr capsule Take 2 capsules by mouth Every Night.      • traZODone (DESYREL) 50 MG tablet Take 1 tablet  by mouth Every Night.      , Scheduled Meds:  [START ON 4/23/2023] azithromycin, 250 mg, Intravenous, Q24H  [START ON 4/23/2023] cefTRIAXone, 1 g, Intravenous, Daily  sodium chloride, 10 mL, Intravenous, Q12H    , Continuous Infusions:   , PRN Meds:  •  acetaminophen **OR** acetaminophen **OR** acetaminophen  •  hydrALAZINE  •  magnesium sulfate **OR** magnesium sulfate **OR** magnesium sulfate  •  melatonin  •  nitroglycerin  •  ondansetron **OR** ondansetron  •  potassium chloride  •  potassium chloride  •  sodium chloride  •  sodium chloride  •  sodium chloride  •  traMADol and Allergies:  Pork-derived products and Protonix [pantoprazole]    Review of Systems  Pertinent items are noted in HPI    Objective     Vital Signs  Temp:  [97.5 °F (36.4 °C)] 97.5 °F (36.4 °C)  Heart Rate:  [82-94] 86  Resp:  [20-27] 24  BP: (112-147)/() 112/87    I/O this shift:  In: 250 [IV Piggyback:250]  Out: -   No intake/output data recorded.    Physical Exam:  GEN: Awake, Lethargic  ENT: PERRL, EOMI, MMM  NECK: Supple, no JVD  CHEST: CTAB, no W/R/C  CV: RRR, no M/G/R  ABD: Soft, NT, +BS  SKIN: Warm and Dry  NEURO: CN's intact      Results Review:   I reviewed the patient's new clinical results.    WBC WBC   Date Value Ref Range Status   04/22/2023 5.00 3.40 - 10.80 10*3/mm3 Final      HGB Hemoglobin   Date Value Ref Range Status   04/22/2023 14.5 13.0 - 17.7 g/dL Final      HCT Hematocrit   Date Value Ref Range Status   04/22/2023 45.4 37.5 - 51.0 % Final      Platlets No results found for: LABPLAT   MCV MCV   Date Value Ref Range Status   04/22/2023 91.7 79.0 - 97.0 fL Final          Sodium Sodium   Date Value Ref Range Status   04/22/2023 146 (H) 136 - 145 mmol/L Final      Potassium Potassium   Date Value Ref Range Status   04/22/2023 4.5 3.5 - 5.2 mmol/L Final     Comment:     Slight hemolysis detected by analyzer. Results may be affected.      Chloride Chloride   Date Value Ref Range Status   04/22/2023 106 98 - 107 mmol/L  Final      CO2 CO2   Date Value Ref Range Status   04/22/2023 22.0 22.0 - 29.0 mmol/L Final      BUN BUN   Date Value Ref Range Status   04/22/2023 76 (H) 8 - 23 mg/dL Final      Creatinine Creatinine   Date Value Ref Range Status   04/22/2023 3.54 (H) 0.76 - 1.27 mg/dL Final      Calcium Calcium   Date Value Ref Range Status   04/22/2023 10.4 8.6 - 10.5 mg/dL Final      PO4 No results found for: CAPO4   Albumin Albumin   Date Value Ref Range Status   04/22/2023 3.5 3.5 - 5.2 g/dL Final      Magnesium No results found for: MG   Uric Acid No results found for: URICACID         [START ON 4/23/2023] azithromycin, 250 mg, Intravenous, Q24H  [START ON 4/23/2023] cefTRIAXone, 1 g, Intravenous, Daily  sodium chloride, 10 mL, Intravenous, Q12H           Assessment & Plan     1) BRET vs CKD progression  2) CKD4 - Cr 2.8 in January - now in the mid 3's  3) Urinary retention  4) Dysuria  5) CHF  6) PNA  7) HTN    PLAN: Straight cath.  Check UA, urine lytes and renal U/S.  Avoid IVF given significant CHF history and elevated BNP.  ABX for PNA per primary.  Consider adding diuretic once Cr appears stable and pending on results of imaging and urine studies.  Avoid nephrotoxins.  Patient may very well be progressing towards dialysis.  Will follow closely.      Tho Pires MD   [facility]  04/22/23  @NOW

## 2023-04-22 NOTE — ED PROVIDER NOTES
Subjective   History of Present Illness  Patient is a 63-year-old male presents to the ED via EMS from [facility] with reports of increased dyspnea overnight.  On chart review patient is supposed to be on 3 to 6 L via nasal cannula to maintain an O2 of 90 to 94%.  Upon arrival patient was on 8 L nonrebreather was placed on nasal cannula and is now on 4 L with an oxygen saturation of 93%.  Patient has a history of encephalopathy and dementia therefore history is limited.  Patient shakes his head no when asked if he has any pain.  No reports of fever or vomiting from the facility.  He is at baseline neurologically.    History provided by:  Patient and EMS personnel      Review of Systems   Constitutional: Negative for fever.   Respiratory: Positive for shortness of breath.    Cardiovascular: Negative for chest pain.   Gastrointestinal: Negative for vomiting.       Past Medical History:   Diagnosis Date   • Adjustment disorder    • Alcohol dependence    • Anxiety    • Atrial fibrillation    • CHF (congestive heart failure)    • CKD (chronic kidney disease) stage 4, GFR 15-29 ml/min    • Dementia    • Depression    • Encephalopathy    • GERD (gastroesophageal reflux disease)    • Hepatitis C    • Hyperlipidemia    • Hypertension        Allergies   Allergen Reactions   • Pork-Derived Products Unknown - Low Severity   • Protonix [Pantoprazole] Unknown - Low Severity       History reviewed. No pertinent surgical history.    History reviewed. No pertinent family history.    Social History     Socioeconomic History   • Marital status: Single           Objective   Physical Exam  Vitals and nursing note reviewed.   Constitutional:       General: He is not in acute distress.     Appearance: He is well-developed. He is not ill-appearing, toxic-appearing or diaphoretic.   HENT:      Head: Normocephalic and atraumatic.      Mouth/Throat:      Mouth: Mucous membranes are moist.      Pharynx: Oropharynx is clear.  "  Eyes:      Extraocular Movements: Extraocular movements intact.      Pupils: Pupils are equal, round, and reactive to light.   Cardiovascular:      Rate and Rhythm: Normal rate and regular rhythm.      Heart sounds: No murmur heard.    No friction rub. No gallop.   Pulmonary:      Effort: Pulmonary effort is normal. No tachypnea or accessory muscle usage.      Breath sounds: Examination of the right-middle field reveals rhonchi. Examination of the left-middle field reveals rhonchi. Examination of the right-lower field reveals rhonchi. Examination of the left-lower field reveals rhonchi. Rhonchi present. No decreased breath sounds, wheezing or rales.   Chest:      Chest wall: No mass, deformity, tenderness or crepitus.   Abdominal:      Palpations: Abdomen is soft. There is no hepatomegaly, splenomegaly or mass.      Tenderness: There is no abdominal tenderness. There is no guarding or rebound.   Musculoskeletal:      Right lower leg: No edema.      Left lower leg: No edema.   Skin:     General: Skin is warm.      Capillary Refill: Capillary refill takes less than 2 seconds.      Findings: No rash.   Neurological:      Mental Status: He is alert. Mental status is at baseline.   Psychiatric:         Mood and Affect: Mood normal.         Behavior: Behavior normal.         Procedures           ED Course      BP (!) 136/110   Pulse 84   Temp 97.5 °F (36.4 °C) (Oral)   Resp 20   Ht 167.6 cm (66\")   Wt 72.6 kg (160 lb)   SpO2 94%   BMI 25.82 kg/m²   Medications   sodium chloride 0.9 % flush 10 mL (has no administration in time range)   cefTRIAXone (ROCEPHIN) 2 g in sodium chloride 0.9 % 100 mL IVPB (has no administration in time range)   azithromycin (ZITHROMAX) 500 mg in sodium chloride 0.9 % 250 mL IVPB-VTB (has no administration in time range)     Labs Reviewed   COMPREHENSIVE METABOLIC PANEL - Abnormal; Notable for the following components:       Result Value    Glucose 107 (*)     BUN 76 (*)     Creatinine " 3.54 (*)     Sodium 146 (*)     AST (SGOT) 52 (*)     Alkaline Phosphatase 265 (*)     Total Bilirubin 1.9 (*)     Anion Gap 18.0 (*)     eGFR 18.3 (*)     All other components within normal limits    Narrative:     GFR Normal >60  Chronic Kidney Disease <60  Kidney Failure <15     BNP (IN-HOUSE) - Abnormal; Notable for the following components:    proBNP 40,715.0 (*)     All other components within normal limits    Narrative:     Among patients with dyspnea, NT-proBNP is highly sensitive for the detection of acute congestive heart failure. In addition NT-proBNP of <300 pg/ml effectively rules out acute congestive heart failure with 99% negative predictive value.     SINGLE HSTROPONIN T - Abnormal; Notable for the following components:    HS Troponin T 155 (*)     All other components within normal limits    Narrative:     High Sensitive Troponin T Reference Range:  <10.0 ng/L- Negative Female for AMI  <15.0 ng/L- Negative Male for AMI  >=10 - Abnormal Female indicating possible myocardial injury.  >=15 - Abnormal Male indicating possible myocardial injury.   Clinicians would have to utilize clinical acumen, EKG, Troponin, and serial changes to determine if it is an Acute Myocardial Infarction or myocardial injury due to an underlying chronic condition.        CBC WITH AUTO DIFFERENTIAL - Abnormal; Notable for the following components:    RDW 19.4 (*)     RDW-SD 66.1 (*)     Neutrophil % 81.3 (*)     Lymphocyte % 12.8 (*)     Eosinophil % 0.2 (*)     Lymphocytes, Absolute 0.60 (*)     nRBC 0.9 (*)     All other components within normal limits   BLOOD CULTURE   BLOOD CULTURE   RAINBOW DRAW    Narrative:     The following orders were created for panel order [place] Draw.  Procedure                               Abnormality         Status                     ---------                               -----------         ------                     Green Top (Gel)[[id number]]                                  Final result                Lavender Top[[id number]]                                     Final result               Gold Top - SST[[id number]]                                   Final result               Light Blue Top[[id number]]                                   Final result                 Please view results for these tests on the individual orders.   LACTIC ACID, PLASMA   GREEN TOP   LAVENDER TOP   GOLD TOP - SST   LIGHT BLUE TOP   CBC AND DIFFERENTIAL    Narrative:     The following orders were created for panel order CBC & Differential.  Procedure                               Abnormality         Status                     ---------                               -----------         ------                     CBC Auto Differential[[id number]]        Abnormal            Final result                 Please view results for these tests on the individual orders.       XR Chest 1 View    Result Date: 4/22/2023  Impression: Patchy airspace opacities throughout both lungs, concerning for multifocal pneumonia and/or pulmonary edema. Electronically Signed: Emily Webster  4/22/2023 7:33 AM EDT  Workstation ID: [id number]                                         Medical Decision Making  Chart Review: Chart sent from [facility] reviewed patient's medications and past medical history.   Labs reviewed from 3/12/2023 including metabolic panel CBC protime INR    Comorbidity: As per past medical history  Differentials: Pneumonia, pleural effusion, CHF, ACS, electrolyte abnormality, COPD    ;this list is not all inclusive and does not constitute the entirety of considered causes  EKG: Interpreted by myself and [place] shows sinus rhythm rate 88 LAD, low voltage in extremity leads, old anterior septal infarct, and nonspecific T wave abnormalities in lateral leads, prolonged QT interval no previous to review  Labs: As above  Radiology: My interpretation chest x-ray significant for multifocal pneumonia correlated with radiologist interpretation as  below  XR Chest 1 View   Final Result    Impression:    Patchy airspace opacities throughout both lungs, concerning for multifocal pneumonia and/or pulmonary edema.    Electronically Signed: Emily Webster      4/22/2023 7:33 AM EDT      Workstation ID: [id number]     Disposition/Treatment:  Appropriate PPE was worn during exam and throughout all encounters with the patient.  While in the ED patient was afebrile and appeared nontoxic was at his baseline oxygen after being taken off nonrebreather.  Patient is at baseline neurologically.  He had presented with complaints of dyspnea from nursing facility via EMS.    EKG showed no acute STEMI.  Troponin found to be elevated at 155 no old troponins to compare to.  BNP elevated at 40,715.  CBC showed a normal white count patient was hemodynamically stable.  Metabolic panel showed glucose 107 BUN 76 creatinine 3.54 sodium 146 potassium 4.5 patient does have a history of CKD does appear to be at his baseline.  Patient also has a history of elevated liver enzymes.  Today AST 52 alk phos 265 total bilirubin 1.9.  Chest x-ray was significant for multifocal pneumonia blood cultures and lactic are currently pending was covered with Rocephin azithromycin.  Findings were stressed to the patient who voiced understand admission.  Spoke to Mimi AKERS who agreed for admission with hospitalist group.  Case was also discussed with attending who was in agreement with plan    Dyspnea, unspecified type: complicated acute illness or injury  Multifocal pneumonia: complicated acute illness or injury  Stage 4 chronic kidney disease: complicated acute illness or injury  Amount and/or Complexity of Data Reviewed  Labs: ordered.  Radiology: ordered.  ECG/medicine tests: ordered.      Risk  Prescription drug management.  Decision regarding hospitalization.          Final diagnoses:   Multifocal pneumonia   Stage 4 chronic kidney disease   Dyspnea, unspecified type       ED Disposition  ED Disposition      ED Disposition   Decision to Admit    Condition   --    Comment   Level of Care: Telemetry [5]   Admitting Physician: GAURAV DAMON [[id number]]   Attending Physician: GAURAV DAMON [[id number]]               No follow-up provider specified.       Medication List      No changes were made to your prescriptions during this visit.          Ion Saenz PA  04/22/23 [id number]

## 2023-04-22 NOTE — H&P
[facility] Medicine Services  History & Physical    Patient Name: Seth Marie  : 1960  MRN: [id number]  Primary Care Physician:  Rigo De Los Santos MD  Date of admission: 2023  Date and Time of Service: 2023 at 0 830    Subjective      Chief Complaint: Shortness of breath    History of Present Illness: Seth Marie is a 63 y.o. male with chronic O2 use at 3 to 6 L, hypertension, HFrEF, ventricular tachycardia/nonischemic cardiomyopathy with dual-chamber AICD in situ, paroxysmal atrial fibrillation, CKD 4, hyperlipidemia, previous alcohol dependence, depressive disorder, previous fungal meningitis, and dementia who currently resides at [facility] who presented to [facility] on 2023 with complaints of increased shortness of breath overnight.  Patient unable to give any meaningful history, states that [facility] and felt short of breath for 30 minutes last night.  Report from emergency room physician assistant states that patient is reportedly at his baseline mental status as per nursing facility, arrived on 8 L nonrebreather but is currently only requiring 4 L.    His chest x-ray demonstrates patchy opacities consistent with pulmonary edema and/or pneumonia, white count normal, lactic acid and blood cultures are pending, he has been given Zithromax and Rocephin in the emergency department.  BNP elevated at 40,715, troponin level elevated at 155, EKG normal sinus rhythm with nonspecific lateral ST abnormality.  His creatinine is 3.5 and BUN 76 which is comparable to previous kidney function obtained 3/12/2023.  AST is elevated at 52, was elevated 1 month ago at 104.      Review of Systems   Unable to perform ROS: dementia        Personal History     Past Medical History:   Diagnosis Date   • Adjustment disorder    • Alcohol dependence    • Anxiety    • Atrial fibrillation    • CHF (congestive heart failure)    • CKD (chronic kidney  disease) stage 4, GFR 15-29 ml/min    • Dementia    • Depression    • Encephalopathy    • GERD (gastroesophageal reflux disease)    • Hepatitis C    • Hyperlipidemia    • Hypertension        History reviewed. No pertinent surgical history.    Family History: family history is not on file. Otherwise pertinent FHx was reviewed and not pertinent to current issue.    Social History:      Home Medications:  Prior to Admission Medications     None            Allergies:  Allergies   Allergen Reactions   • Pork-Derived Products Unknown - Low Severity   • Protonix [Pantoprazole] Unknown - Low Severity       Objective      Vitals:   Temp:  [97.5 °F (36.4 °C)] 97.5 °F (36.4 °C)  Heart Rate:  [82-88] 84  Resp:  [20] 20  BP: (136-147)/(103-112) 136/110  Flow (L/min):  [4-8] 4    Physical Exam  Cardiovascular:      Rate and Rhythm: Normal rate and regular rhythm.      Pulses: Normal pulses.      Heart sounds: No murmur heard.  Pulmonary:      Effort: Pulmonary effort is normal.      Breath sounds: Rales (Bilateral lower lobes) present.      Comments: O2 at 4 liters  Abdominal:      General: Bowel sounds are normal. There is no distension.   Musculoskeletal:      Right lower leg: No edema.      Left lower leg: No edema.   Skin:     General: Skin is warm and dry.   Neurological:      Mental Status: He is alert.   Psychiatric:         Mood and Affect: Mood normal.            Result Review    Result Review:  I have personally reviewed the results from the time of this admission to 4/22/2023 08:33 EDT and agree with these findings:  [x]  Laboratory  [x]  Microbiology  [x]  Radiology chest x-ray independently reviewed and interpreted  [x]  EKG/Telemetry EKG independently reviewed and interpreted  []  Cardiology/Vascular   []  Pathology  [x]  Old records  []  Other:  Most notable findings include:  (See history of present illness and plan)      Assessment & Plan        Active Hospital Problems:  Active Hospital Problems    Diagnosis     • **Multifocal pneumonia      Plan:   #Shortness of breath  #Pneumonia, community acquired  -Rocephin and Zithromax IV given in emergency department, continue for now  -Follow CBC  -Follow lactic acid and blood cultures  -Check viral panel  -Check urine Legionella and strep pneumococcal    #Acute on chronic systolic heart failure, HFrEF  #CardioMEMs placed 12/2022  #Chronic O2 at 3 to 6 L  -Currently requiring 4 L  -Maintain O2 sat 88 to 92%  -Give Bumex 1 mg IV now    #Elevated troponin, initial level 155  #Elevated LFTs  -Trend troponins, CMP  -Review of left heart cath 12/21/2022 at [facility] demonstrated nonobstructive CAD    #Nonischemic cardiomyopathy  #Ventricular tachycardia, paroxysmal atrial fibrillation  -Dual-chamber AICD in situ   -Review home medications after reconciled, resume suppressant medications    #CKD 4, baseline creatinine 3.5-3.8  -Follow CMP    #HTN  -Resume home medications after reconciled  -Hypertensive on admission, Apresoline as needed    #Hyperlipidemia  -Resume home medications after reconciled    #Dementia  #Previous EtOH dependence  #Depressive disorder  #Previous fungal meningitis  -Anticipate return to nursing home at discharge  -Reported mental status at baseline  -Resume home medications after reconciled      DVT prophylaxis:  No DVT prophylaxis order currently exists.    CODE STATUS:       Admission Status:  I believe this patient meets observation status.    I discussed the patient's findings and my recommendations with patient.    This patient has been examined wearing the appropriate protective equipment  Signature: Electronically signed by IRINEO Longoria, 04/22/23, 08:33 EDT.  [facility] Hospitalist Team

## 2023-04-22 NOTE — ED NOTES
Called [facility] again to speak to pt RN, no answer. Left phone number and name. CN attempting to contact [facility] DON.

## 2023-04-22 NOTE — PLAN OF CARE
Goal Outcome Evaluation:  Plan of Care Reviewed With: patient        Progress: no change  Outcome Evaluation: Patient shows no s/s of distress and vitals are stable. Bed alarm on and call light within reach. Will continue to observe.

## 2023-04-22 NOTE — CONSULTS
Referring Provider: Bernard Christensen MD    Reason for Consultation: Heart failure exacerbation, elevated troponin, cardiomyopathy management      Patient Care Team:  Rigo De Los Santos MD as PCP - General (Family Medicine)      SUBJECTIVE     Chief Complaint: Shortness of breath    History of present illness:  Seth Marie is a 63 y.o. male with extensive cardiac history including hypertension,nonischemic cardiomyopathy with dual-chamber ICD, EF 35%, paroxysmal atrial fibrillation, chronic kidney disease stage IV, hyperlipidemia and dementia who presents to the hospital with shortness of breath.  He resides in the nursing facility at [facility] and typically follows at [facility].  He was noted to be increasingly short of breath overnight.  Patient is unable to provide much history due to dementia.  He is somnolent during exam and states that his liver may be acting up.  Initial work-up in the emergency room revealed mildly elevated troponin of 155 and proBNP of more than 40,000.  His sodium was 146, creatinine is 3.5.  Chest x-ray shows pulmonary edema.  Cardiology has been consulted for further evaluation and management    Review of systems:  Unable to obtain review of system due to his current mental status    Personal History:      Past Medical History:   Diagnosis Date   • Adjustment disorder    • Alcohol dependence    • Anxiety    • Atrial fibrillation    • CHF (congestive heart failure)    • CKD (chronic kidney disease) stage 4, GFR 15-29 ml/min    • Dementia    • Depression    • Elevated cholesterol    • Encephalopathy    • GERD (gastroesophageal reflux disease)    • Hepatitis C    • Hyperlipidemia    • Hypertension        Past Surgical History:   Procedure Laterality Date   • PACEMAKER REPLACEMENT         History reviewed. No pertinent family history.    Social History     Tobacco Use   • Smoking status: Former     Types: Cigarettes   Vaping Use   • Vaping Use: Never used   Substance Use Topics   •  Alcohol use: Not Currently   • Drug use: Not Currently     Types: Heroin        Medications Prior to Admission   Medication Sig Dispense Refill Last Dose   • albuterol sulfate  (90 Base) MCG/ACT inhaler Inhale 2 puffs Every 4 (Four) Hours As Needed for Wheezing.      • apixaban (ELIQUIS) 5 MG tablet tablet Take 1 tablet by mouth 2 (Two) Times a Day.      • atorvastatin (LIPITOR) 80 MG tablet Take 1 tablet by mouth Every Evening.      • buPROPion SR (WELLBUTRIN SR) 150 MG 12 hr tablet Take 1 tablet by mouth Daily.      • carvedilol (COREG) 6.25 MG tablet Take 1 tablet by mouth 2 (Two) Times a Day With Meals.      • Cholecalciferol 25 MCG (1000 UT) tablet Take 2 tablets by mouth Daily.      • ferrous sulfate 325 (65 FE) MG tablet Take 1 tablet by mouth Daily With Breakfast.      • finasteride (PROSCAR) 5 MG tablet Take 1 tablet by mouth Daily.      • folic acid (FOLVITE) 1 MG tablet Take 1 tablet by mouth Daily.      • furosemide (LASIX) 40 MG tablet Take 1 tablet by mouth Daily.      • hydrALAZINE (APRESOLINE) 10 MG tablet Take 1 tablet by mouth 3 (Three) Times a Day.      • ipratropium-albuterol (DUO-NEB) 0.5-2.5 mg/3 ml nebulizer Take 3 mL by nebulization Every 4 (Four) Hours As Needed for Wheezing.      • isosorbide dinitrate (ISORDIL) 10 MG tablet Take 1 tablet by mouth 3 (Three) Times a Day.      • Lidocaine 4 % patch Apply 2 patches topically Every Morning.      • Menthol-Zinc Oxide (CALMOSEPTINE EX) Apply 1 application topically 2 (Two) Times a Day.      • mexiletine (MEXITIL) 150 MG capsule Take 1 capsule by mouth 3 (Three) Times a Day.      • naloxone (NARCAN) 4 MG/0.1ML nasal spray 1 spray into the nostril(s) as directed by provider As Needed.      • nitroglycerin (NITROLINGUAL) 0.4 MG/SPRAY spray Place 1 spray under the tongue Every 5 (Five) Minutes As Needed for Chest Pain.      • omeprazole (priLOSEC) 20 MG capsule Take 1 capsule by mouth Daily.      • polyethylene glycol (MIRALAX) 17 g packet  "Take 17 g by mouth Daily.      • sodium bicarbonate 650 MG tablet Take 2 tablets by mouth 2 (Two) Times a Day.      • tamsulosin (FLOMAX) 0.4 MG capsule 24 hr capsule Take 2 capsules by mouth Every Night.      • traZODone (DESYREL) 50 MG tablet Take 1 tablet by mouth Every Night.           Allergies:     Pork-derived products and Protonix [pantoprazole]    Scheduled Meds:[START ON 4/23/2023] azithromycin, 250 mg, Intravenous, Q24H  [START ON 4/23/2023] cefTRIAXone, 1 g, Intravenous, Daily  sodium chloride, 10 mL, Intravenous, Q12H      Continuous Infusions:   PRN Meds:•  acetaminophen **OR** acetaminophen **OR** acetaminophen  •  hydrALAZINE  •  magnesium sulfate **OR** magnesium sulfate **OR** magnesium sulfate  •  melatonin  •  nitroglycerin  •  ondansetron **OR** ondansetron  •  potassium chloride  •  potassium chloride  •  sodium chloride  •  sodium chloride  •  sodium chloride  •  traMADol      OBJECTIVE    Vital Signs  Vitals:    04/22/23 0900 04/22/23 0901 04/22/23 1012 04/22/23 1223   BP:   129/98 112/87   BP Location:   Right arm Left arm   Patient Position:   Lying Lying   Pulse: 90 89 86 86   Resp:   27 24   Temp:       TempSrc:   Axillary    SpO2: 94% 94% 93% 96%   Weight:   66.7 kg (147 lb 0.8 oz)    Height:   167.6 cm (66\")        Flowsheet Rows    Flowsheet Row First Filed Value   Admission Height 167.6 cm (66\") Documented at 04/22/2023 0555   Admission Weight 72.6 kg (160 lb) Documented at 04/22/2023 0555            Intake/Output Summary (Last 24 hours) at 4/22/2023 1501  Last data filed at 4/22/2023 1430  Gross per 24 hour   Intake 250 ml   Output 300 ml   Net -50 ml        Telemetry: Sinus rhythm    Physical Exam:  The patient is somnolent and disoriented  Vital signs as noted above.  Head and neck revealed no carotid bruits or jugular venous distention.  No thyromegaly or lymph adenopathy is present  Bilateral rhonchi and currently on 4 L nasal cannula.  Heart normal first and second heart " sounds.No murmur.  No precordial rub is present.  No gallop is present.  Abdomen soft and nontender.  No organomegaly is present.  Extremities with good peripheral pulses without any pedal edema.  Skin warm and dry.  Musculoskeletal system is grossly normal  CNS grossly normal      Results Review:  I have personally reviewed the results from the time of this admission to 4/22/2023 15:01 EDT and agree with these findings:  []  Laboratory  []  Microbiology  []  Radiology  []  EKG/Telemetry   []  Cardiology/Vascular   []  Pathology  []  Old records  []  Other:    Most notable findings include:     Lab Results (last 24 hours)     Procedure Component Value Units Date/Time    Urinalysis With Culture If Indicated - Straight Cath [[id number]]  (Abnormal) Collected: 04/22/23 1429    Specimen: Urine from Straight Cath Updated: 04/22/23 1450     Color, UA Yellow     Appearance, UA Clear     pH, UA 5.5     Specific Gravity, UA 1.015     Glucose, UA Negative     Ketones, UA Negative     Bilirubin, UA Negative     Blood, UA Negative     Protein, UA >=300 mg/dL (3+)     Leuk Esterase, UA Negative     Nitrite, UA Negative     Urobilinogen, UA 1.0 E.U./dL    Narrative:      In absence of clinical symptoms, the presence of pyuria, bacteria, and/or nitrites on the urinalysis result does not correlate with infection.    Urinalysis, Microscopic Only - Straight Cath [[id number]] Collected: 04/22/23 1429    Specimen: Urine from Straight Cath Updated: 04/22/23 1449    CANDIDA AURIS SCREEN - Swab, Axilla Right, Axilla Left and Groin [[id number]] Updated: 04/22/23 1448    Specimen: Swab from Axilla Right, Axilla Left and Groin     Chloride, Urine, Random - Urine, Clean Catch [[id number]] Collected: 04/22/23 1429    Specimen: Urine, Clean Catch Updated: 04/22/23 1440    Sodium, Urine, Random - Urine, Clean Catch [[id number]] Collected: 04/22/23 1429    Specimen: Urine, Clean Catch Updated: 04/22/23 1440    Protein, Urine, Random - Urine, Clean  Catch [[id number]] Collected: 04/22/23 1429    Specimen: Urine, Clean Catch Updated: 04/22/23 1440    Legionella Antigen, Urine - Urine, Urine, Clean Catch [[id number]] Collected: 04/22/23 1429    Specimen: Urine, Clean Catch Updated: 04/22/23 1440    S. Pneumo Ag Urine or CSF - Urine, Urine, Clean Catch [[id number]] Collected: 04/22/23 1429    Specimen: Urine, Clean Catch Updated: 04/22/23 1440    Creatinine Urine Random (kidney function) GFR component - Urine, Clean Catch [[id number]] Collected: 04/22/23 1429    Specimen: Urine, Clean Catch Updated: 04/22/23 1440    Magnesium [[id number]]  (Normal) Collected: 04/22/23 0618    Specimen: Blood Updated: 04/22/23 1331     Magnesium 2.2 mg/dL     Phosphorus [[id number]]  (Normal) Collected: 04/22/23 0618    Specimen: Blood Updated: 04/22/23 1331     Phosphorus 3.1 mg/dL     High Sensitivity Troponin T [[id number]] Updated: 04/22/23 1222    Specimen: Blood     Procalcitonin [[id number]] Updated: 04/22/23 1222    Specimen: Blood     Lactic Acid, Plasma [[id number]] Updated: 04/22/23 1222    Specimen: Blood     Respiratory Panel PCR w/COVID-19(SARS-CoV-2) JOSSE/AARON/[facility]ON/PAD/COR/MAD/CARMEN In-House, NP Swab in UTM/VTM, 3-4 HR TAT - Swab, Nasopharynx [[id number]]  (Normal) Collected: 04/22/23 0855    Specimen: Swab from Nasopharynx Updated: 04/22/23 1043     ADENOVIRUS, PCR Not Detected     Coronavirus 229E Not Detected     Coronavirus HKU1 Not Detected     Coronavirus NL63 Not Detected     Coronavirus OC43 Not Detected     COVID19 Not Detected     Human Metapneumovirus Not Detected     Human Rhinovirus/Enterovirus Not Detected     Influenza A PCR Not Detected     Influenza B PCR Not Detected     Parainfluenza Virus 1 Not Detected     Parainfluenza Virus 2 Not Detected     Parainfluenza Virus 3 Not Detected     Parainfluenza Virus 4 Not Detected     RSV, PCR Not Detected     Bordetella pertussis pcr Not Detected     Bordetella parapertussis PCR Not Detected     Chlamydophila pneumoniae PCR  Not Detected     Mycoplasma pneumo by PCR Not Detected    Narrative:      In the setting of a positive respiratory panel with a viral infection PLUS a negative procalcitonin without other underlying concern for bacterial infection, consider observing off antibiotics or discontinuation of antibiotics and continue supportive care. If the respiratory panel is positive for atypical bacterial infection (Bordetella pertussis, Chlamydophila pneumoniae, or Mycoplasma pneumoniae), consider antibiotic de-escalation to target atypical bacterial infection.    Blood Culture - Blood, Arm, Left [[id number]] Collected: 04/22/23 0825    Specimen: Blood from Arm, Left Updated: 04/22/23 0830    Blood Culture - Blood, Arm, Left [[id number]] Collected: 04/22/23 0825    Specimen: Blood from Arm, Left Updated: 04/22/23 0830    POC Lactate [[id number]]  (Normal) Collected: 04/22/23 0828    Specimen: Blood Updated: 04/22/23 0829     Lactate 1.6 mmol/L      Comment: Serial Number: [id number]:  [id number]       [place] Draw [[id number]] Collected: 04/22/23 0618    Specimen: Blood Updated: 04/22/23 0730    Narrative:      The following orders were created for panel order [place] Draw.  Procedure                               Abnormality         Status                     ---------                               -----------         ------                     Green Top (Gel)[[id number]]                                  Final result               Lavender Top[[id number]]                                     Final result               Gold Top - SST[[id number]]                                   Final result               Light Blue Top[[id number]]                                   Final result                 Please view results for these tests on the individual orders.    Gold Top - SST [[id number]] Collected: 04/22/23 0618    Specimen: Blood Updated: 04/22/23 0730     Extra Tube Hold for add-ons.     Comment: Auto resulted.       Light Blue Top  [[id number]] Collected: 04/22/23 0618    Specimen: Blood Updated: 04/22/23 0730     Extra Tube Hold for add-ons.     Comment: Auto resulted       Green Top (Gel) [[id number]] Collected: 04/22/23 0618    Specimen: Blood Updated: 04/22/23 0730     Extra Tube Hold for add-ons.     Comment: Auto resulted.       Lavender Top [[id number]] Collected: 04/22/23 0618    Specimen: Blood Updated: 04/22/23 0730     Extra Tube hold for add-on     Comment: Auto resulted       BNP [[id number]]  (Abnormal) Collected: 04/22/23 0618    Specimen: Blood Updated: 04/22/23 0711     proBNP 40,715.0 pg/mL     Narrative:      Among patients with dyspnea, NT-proBNP is highly sensitive for the detection of acute congestive heart failure. In addition NT-proBNP of <300 pg/ml effectively rules out acute congestive heart failure with 99% negative predictive value.      Single High Sensitivity Troponin T [[id number]]  (Abnormal) Collected: 04/22/23 0618    Specimen: Blood Updated: 04/22/23 0704     HS Troponin T 155 ng/L     Narrative:      High Sensitive Troponin T Reference Range:  <10.0 ng/L- Negative Female for AMI  <15.0 ng/L- Negative Male for AMI  >=10 - Abnormal Female indicating possible myocardial injury.  >=15 - Abnormal Male indicating possible myocardial injury.   Clinicians would have to utilize clinical acumen, EKG, Troponin, and serial changes to determine if it is an Acute Myocardial Infarction or myocardial injury due to an underlying chronic condition.         Comprehensive Metabolic Panel [[id number]]  (Abnormal) Collected: 04/22/23 0618    Specimen: Blood Updated: 04/22/23 0701     Glucose 107 mg/dL      BUN 76 mg/dL      Creatinine 3.54 mg/dL      Sodium 146 mmol/L      Potassium 4.5 mmol/L      Comment: Slight hemolysis detected by analyzer. Results may be affected.        Chloride 106 mmol/L      CO2 22.0 mmol/L      Calcium 10.4 mg/dL      Total Protein 7.5 g/dL      Albumin 3.5 g/dL      ALT (SGPT) 33 U/L      AST (SGOT) 52 U/L       Alkaline Phosphatase 265 U/L      Total Bilirubin 1.9 mg/dL      Globulin 4.0 gm/dL      A/G Ratio 0.9 g/dL      BUN/Creatinine Ratio 21.5     Anion Gap 18.0 mmol/L      eGFR 18.3 mL/min/1.73     Narrative:      GFR Normal >60  Chronic Kidney Disease <60  Kidney Failure <15      CBC & Differential [[id number]]  (Abnormal) Collected: 04/22/23 0618    Specimen: Blood Updated: 04/22/23 0648    Narrative:      The following orders were created for panel order CBC & Differential.  Procedure                               Abnormality         Status                     ---------                               -----------         ------                     CBC Auto Differential[[id number]]        Abnormal            Final result                 Please view results for these tests on the individual orders.    CBC Auto Differential [[id number]]  (Abnormal) Collected: 04/22/23 0618    Specimen: Blood Updated: 04/22/23 0648     WBC 5.00 10*3/mm3      RBC 4.94 10*6/mm3      Hemoglobin 14.5 g/dL      Hematocrit 45.4 %      MCV 91.7 fL      MCH 29.3 pg      MCHC 32.0 g/dL      RDW 19.4 %      RDW-SD 66.1 fl      MPV 9.6 fL      Platelets 169 10*3/mm3      Neutrophil % 81.3 %      Lymphocyte % 12.8 %      Monocyte % 5.2 %      Eosinophil % 0.2 %      Basophil % 0.5 %      Neutrophils, Absolute 4.00 10*3/mm3      Lymphocytes, Absolute 0.60 10*3/mm3      Monocytes, Absolute 0.30 10*3/mm3      Eosinophils, Absolute 0.00 10*3/mm3      Basophils, Absolute 0.00 10*3/mm3      nRBC 0.9 /100 WBC           Imaging Results (Last 24 Hours)     Procedure Component Value Units Date/Time    XR Chest 1 View [[id number]] Collected: 04/22/23 0732     Updated: 04/22/23 0735    Narrative:      XR CHEST 1 VW    Date of Exam: 4/22/2023 6:43 AM EDT    Indication: CHF/COPD Protocol    Comparison: None available.    Findings:  There are multifocal airspace opacities throughout both lungs, most confluent in the right lower lung. No pneumothorax or large  pleural effusion is seen. Cardiac silhouette is enlarged. Pacemaker is noted.      Impression:      Impression:  Patchy airspace opacities throughout both lungs, concerning for multifocal pneumonia and/or pulmonary edema.      Electronically Signed: Emily Herreraley    4/22/2023 7:33 AM EDT    Workstation ID: [id number]          LAB RESULTS (LAST 7 DAYS)    CBC  Results from last 7 days   Lab Units 04/22/23  0618   WBC 10*3/mm3 5.00   RBC 10*6/mm3 4.94   HEMOGLOBIN g/dL 14.5   HEMATOCRIT % 45.4   MCV fL 91.7   PLATELETS 10*3/mm3 169       BMP  Results from last 7 days   Lab Units 04/22/23  0618   SODIUM mmol/L 146*   POTASSIUM mmol/L 4.5   CHLORIDE mmol/L 106   CO2 mmol/L 22.0   BUN mg/dL 76*   CREATININE mg/dL 3.54*   GLUCOSE mg/dL 107*   MAGNESIUM mg/dL 2.2   PHOSPHORUS mg/dL 3.1       CMP   Results from last 7 days   Lab Units 04/22/23  0618   SODIUM mmol/L 146*   POTASSIUM mmol/L 4.5   CHLORIDE mmol/L 106   CO2 mmol/L 22.0   BUN mg/dL 76*   CREATININE mg/dL 3.54*   GLUCOSE mg/dL 107*   ALBUMIN g/dL 3.5   BILIRUBIN mg/dL 1.9*   ALK PHOS U/L 265*   AST (SGOT) U/L 52*   ALT (SGPT) U/L 33       BNP        TROPONIN  Results from last 7 days   Lab Units 04/22/23  0618   HSTROP T ng/L 155*       CoAg        Creatinine Clearance  Estimated Creatinine Clearance: 19.6 mL/min (A) (by C-G formula based on SCr of 3.54 mg/dL (H)).    ABG          Radiology  XR Chest 1 View    Result Date: 4/22/2023  Impression: Patchy airspace opacities throughout both lungs, concerning for multifocal pneumonia and/or pulmonary edema. Electronically Signed: Emily Darin  4/22/2023 7:33 AM EDT  Workstation ID: [id number]        EKG  I personally viewed and interpreted the patient's EKG/Telemetry data:  ECG 12 Lead Dyspnea   Final Result   HEART RATE= 88  bpm   RR Interval= 680  ms   VA Interval= 167  ms   P Horizontal Axis= 2  deg   P Front Axis= 49  deg   QRSD Interval= 110  ms   QT Interval= 433  ms   QRS Axis= -53  deg   T Wave Axis= 133  deg   -  ABNORMAL ECG -   Sinus rhythm   LAD, consider left anterior fascicular block   Low voltage, extremity leads   Anteroseptal infarct, old   Nonspecific T abnormalities, lateral leads   Prolonged QT interval   No previous ECG available for comparison   Electronically Signed By: Tony Deleon (Dennis) 22-Apr-2023 10:27:58   Date and Time of Study: 2023-04-22 06:25:45            Echocardiogram:          Stress Test:        Cardiac Catheterization:  No results found for this or any previous visit.        Other:      ASSESSMENT & PLAN:    Principal Problem:    Multifocal pneumonia  Elevated troponin  HFrEF  CKD stage IV  Dementia  Atrial fibrillation      CHF exacerbation/HFrEF  Previous echocardiogram showed EF of 32% with end-diastolic dimension of 5 cm, severe pulmonary hypertension, severe TR RVSP 51 mmHg.  Previous cardiac catheterization in December 2022 showed nonobstructive coronary disease with slow flow phenomena.  Cardiac MRI with no evidence of amyloid  Restart Toprol-XL 25 mg p.o. daily  Unable to start ACE inhibitor, ARNI or SGLT2 due to renal dysfunction  Previous attempts were made for afterload reduction with hydralazine and Isordil.  We will resume if blood pressure allows  Repeat echocardiogram  Start IV diuretics  Low threshold to offer a right heart cath for volume assessment  Previous right heart cath showed cardiac index of 1.6  He has CardioMEMS in place  He also has ICD    Pneumonia  He has been started on broad-spectrum antibiotics  Oxygen supplementation as needed    Atrial fibrillation  Start anticoagulation with Eliquis  Rate control with metoprolol XL    Hyperlipidemia  Restart high intensity statin    Chronic kidney disease  Poor renal function with GFR of less than 20.  Avoid nephrotoxins.  Likely due to cardiorenal.  Nephrology consultation  May end up on hemodialysis soon    Neuropathy  Continue gabapentin

## 2023-04-22 NOTE — Clinical Note
Level of Care: Telemetry [5]   Admitting Physician: GAURAV DAMON [[id number]]   Attending Physician: GAURAV DAMON [[id number]]

## 2023-04-23 ENCOUNTER — APPOINTMENT (OUTPATIENT)
Dept: GENERAL RADIOLOGY | Facility: HOSPITAL | Age: 63
End: 2023-04-23
Payer: MEDICARE

## 2023-04-23 ENCOUNTER — APPOINTMENT (OUTPATIENT)
Dept: CARDIOLOGY | Facility: HOSPITAL | Age: 63
End: 2023-04-23
Payer: MEDICARE

## 2023-04-23 LAB
ALBUMIN SERPL-MCNC: 3 G/DL (ref 3.5–5.2)
ALBUMIN/GLOB SERPL: 0.9 G/DL
ALP SERPL-CCNC: 218 U/L (ref 39–117)
ALT SERPL W P-5'-P-CCNC: 27 U/L (ref 1–41)
ANION GAP SERPL CALCULATED.3IONS-SCNC: 16 MMOL/L (ref 5–15)
AST SERPL-CCNC: 43 U/L (ref 1–40)
BASOPHILS # BLD AUTO: 0 10*3/MM3 (ref 0–0.2)
BASOPHILS NFR BLD AUTO: 0.3 % (ref 0–1.5)
BH CV ECHO MEAS - ACS: 2.5 CM
BH CV ECHO MEAS - AI P1/2T: 402.6 MSEC
BH CV ECHO MEAS - AO MAX PG: 2.7 MMHG
BH CV ECHO MEAS - AO MEAN PG: 2 MMHG
BH CV ECHO MEAS - AO V2 MAX: 82.5 CM/SEC
BH CV ECHO MEAS - AO V2 VTI: 17.6 CM
BH CV ECHO MEAS - AVA(I,D): 1.84 CM2
BH CV ECHO MEAS - EDV(CUBED): 125 ML
BH CV ECHO MEAS - EDV(MOD-SP4): 138 ML
BH CV ECHO MEAS - EF(MOD-BP): 15 %
BH CV ECHO MEAS - EF(MOD-SP4): 15.2 %
BH CV ECHO MEAS - EF_3D-VOL: 16 %
BH CV ECHO MEAS - ESV(CUBED): 97.3 ML
BH CV ECHO MEAS - ESV(MOD-SP4): 117 ML
BH CV ECHO MEAS - FS: 8 %
BH CV ECHO MEAS - IVS/LVPW: 1 CM
BH CV ECHO MEAS - IVSD: 1.3 CM
BH CV ECHO MEAS - LA DIMENSION: 4.3 CM
BH CV ECHO MEAS - LAT PEAK E' VEL: 5.5 CM/SEC
BH CV ECHO MEAS - LV DIASTOLIC VOL/BSA (35-75): 78.2 CM2
BH CV ECHO MEAS - LV MASS(C)D: 261.8 GRAMS
BH CV ECHO MEAS - LV MAX PG: 0.93 MMHG
BH CV ECHO MEAS - LV MEAN PG: 0 MMHG
BH CV ECHO MEAS - LV SYSTOLIC VOL/BSA (12-30): 66.3 CM2
BH CV ECHO MEAS - LV V1 MAX: 48.2 CM/SEC
BH CV ECHO MEAS - LV V1 VTI: 9.3 CM
BH CV ECHO MEAS - LVIDD: 5 CM
BH CV ECHO MEAS - LVIDS: 4.6 CM
BH CV ECHO MEAS - LVOT AREA: 3.5 CM2
BH CV ECHO MEAS - LVOT DIAM: 2.1 CM
BH CV ECHO MEAS - LVPWD: 1.3 CM
BH CV ECHO MEAS - MED PEAK E' VEL: 4.6 CM/SEC
BH CV ECHO MEAS - MR MAX PG: 123.7 MMHG
BH CV ECHO MEAS - MR MAX VEL: 556 CM/SEC
BH CV ECHO MEAS - MR MEAN PG: 73 MMHG
BH CV ECHO MEAS - MR MEAN VEL: 396 CM/SEC
BH CV ECHO MEAS - MR VTI: 198 CM
BH CV ECHO MEAS - MV A MAX VEL: 43.5 CM/SEC
BH CV ECHO MEAS - MV DEC SLOPE: 488 CM/SEC2
BH CV ECHO MEAS - MV DEC TIME: 0.09 MSEC
BH CV ECHO MEAS - MV E MAX VEL: 88.6 CM/SEC
BH CV ECHO MEAS - MV E/A: 2.04
BH CV ECHO MEAS - MV MAX PG: 3.8 MMHG
BH CV ECHO MEAS - MV MEAN PG: 2 MMHG
BH CV ECHO MEAS - MV P1/2T: 63 MSEC
BH CV ECHO MEAS - MV V2 VTI: 20.1 CM
BH CV ECHO MEAS - MVA(P1/2T): 3.5 CM2
BH CV ECHO MEAS - MVA(VTI): 1.61 CM2
BH CV ECHO MEAS - PA ACC TIME: 0.09 SEC
BH CV ECHO MEAS - PA PR(ACCEL): 40.8 MMHG
BH CV ECHO MEAS - PA V2 MAX: 43.4 CM/SEC
BH CV ECHO MEAS - RAP SYSTOLE: 15 MMHG
BH CV ECHO MEAS - RV MAX PG: 0.31 MMHG
BH CV ECHO MEAS - RV V1 MAX: 27.9 CM/SEC
BH CV ECHO MEAS - RV V1 VTI: 6 CM
BH CV ECHO MEAS - RVDD: 5 CM
BH CV ECHO MEAS - RVSP: 58.8 MMHG
BH CV ECHO MEAS - SI(MOD-SP4): 11.9 ML/M2
BH CV ECHO MEAS - SV(LVOT): 32.3 ML
BH CV ECHO MEAS - SV(MOD-SP4): 21 ML
BH CV ECHO MEAS - TAPSE (>1.6): 1.22 CM
BH CV ECHO MEAS - TR MAX PG: 43.8 MMHG
BH CV ECHO MEAS - TR MAX VEL: 331 CM/SEC
BH CV ECHO MEASUREMENTS AVERAGE E/E' RATIO: 17.54
BH CV XLRA - TDI S': 7.1 CM/SEC
BILIRUB SERPL-MCNC: 1.2 MG/DL (ref 0–1.2)
BUN SERPL-MCNC: 78 MG/DL (ref 8–23)
BUN/CREAT SERPL: 22.1 (ref 7–25)
CALCIUM SPEC-SCNC: 10.2 MG/DL (ref 8.6–10.5)
CHLORIDE SERPL-SCNC: 110 MMOL/L (ref 98–107)
CO2 SERPL-SCNC: 21 MMOL/L (ref 22–29)
CREAT SERPL-MCNC: 3.53 MG/DL (ref 0.76–1.27)
DEPRECATED RDW RBC AUTO: 66.5 FL (ref 37–54)
EGFRCR SERPLBLD CKD-EPI 2021: 18.4 ML/MIN/1.73
EOSINOPHIL # BLD AUTO: 0 10*3/MM3 (ref 0–0.4)
EOSINOPHIL NFR BLD AUTO: 0.7 % (ref 0.3–6.2)
ERYTHROCYTE [DISTWIDTH] IN BLOOD BY AUTOMATED COUNT: 19.9 % (ref 12.3–15.4)
GLOBULIN UR ELPH-MCNC: 3.5 GM/DL
GLUCOSE SERPL-MCNC: 105 MG/DL (ref 65–99)
HCT VFR BLD AUTO: 43.7 % (ref 37.5–51)
HGB BLD-MCNC: 13.4 G/DL (ref 13–17.7)
LEFT ATRIUM VOLUME INDEX: 64.2 ML/M2
LYMPHOCYTES # BLD AUTO: 0.7 10*3/MM3 (ref 0.7–3.1)
LYMPHOCYTES NFR BLD AUTO: 17 % (ref 19.6–45.3)
MAGNESIUM SERPL-MCNC: 2.3 MG/DL (ref 1.6–2.4)
MAXIMAL PREDICTED HEART RATE: 155 BPM
MCH RBC QN AUTO: 28.9 PG (ref 26.6–33)
MCHC RBC AUTO-ENTMCNC: 30.6 G/DL (ref 31.5–35.7)
MCV RBC AUTO: 94.4 FL (ref 79–97)
MONOCYTES # BLD AUTO: 0.2 10*3/MM3 (ref 0.1–0.9)
MONOCYTES NFR BLD AUTO: 4.5 % (ref 5–12)
NEUTROPHILS NFR BLD AUTO: 3.1 10*3/MM3 (ref 1.7–7)
NEUTROPHILS NFR BLD AUTO: 77.5 % (ref 42.7–76)
NRBC BLD AUTO-RTO: 0.7 /100 WBC (ref 0–0.2)
PHOSPHATE SERPL-MCNC: 4.1 MG/DL (ref 2.5–4.5)
PLATELET # BLD AUTO: 160 10*3/MM3 (ref 140–450)
PMV BLD AUTO: 9 FL (ref 6–12)
POTASSIUM SERPL-SCNC: 4.3 MMOL/L (ref 3.5–5.2)
PROCALCITONIN SERPL-MCNC: 0.24 NG/ML (ref 0–0.25)
PROT SERPL-MCNC: 6.5 G/DL (ref 6–8.5)
RBC # BLD AUTO: 4.63 10*6/MM3 (ref 4.14–5.8)
SINUS: 3.4 CM
SODIUM SERPL-SCNC: 147 MMOL/L (ref 136–145)
STJ: 2.6 CM
STRESS TARGET HR: 132 BPM
TROPONIN T SERPL HS-MCNC: 130 NG/L
WBC NRBC COR # BLD: 4 10*3/MM3 (ref 3.4–10.8)

## 2023-04-23 PROCEDURE — 83735 ASSAY OF MAGNESIUM: CPT | Performed by: INTERNAL MEDICINE

## 2023-04-23 PROCEDURE — 84145 PROCALCITONIN (PCT): CPT | Performed by: NURSE PRACTITIONER

## 2023-04-23 PROCEDURE — 80053 COMPREHEN METABOLIC PANEL: CPT | Performed by: NURSE PRACTITIONER

## 2023-04-23 PROCEDURE — 99233 SBSQ HOSP IP/OBS HIGH 50: CPT | Performed by: INTERNAL MEDICINE

## 2023-04-23 PROCEDURE — 36415 COLL VENOUS BLD VENIPUNCTURE: CPT | Performed by: NURSE PRACTITIONER

## 2023-04-23 PROCEDURE — 93306 TTE W/DOPPLER COMPLETE: CPT | Performed by: INTERNAL MEDICINE

## 2023-04-23 PROCEDURE — 25010000002 CEFTRIAXONE PER 250 MG: Performed by: NURSE PRACTITIONER

## 2023-04-23 PROCEDURE — 25010000002 AZITHROMYCIN PER 500 MG: Performed by: NURSE PRACTITIONER

## 2023-04-23 PROCEDURE — 93306 TTE W/DOPPLER COMPLETE: CPT

## 2023-04-23 PROCEDURE — 71045 X-RAY EXAM CHEST 1 VIEW: CPT

## 2023-04-23 PROCEDURE — 84100 ASSAY OF PHOSPHORUS: CPT | Performed by: INTERNAL MEDICINE

## 2023-04-23 PROCEDURE — 85025 COMPLETE CBC W/AUTO DIFF WBC: CPT | Performed by: INTERNAL MEDICINE

## 2023-04-23 PROCEDURE — 84484 ASSAY OF TROPONIN QUANT: CPT | Performed by: NURSE PRACTITIONER

## 2023-04-23 RX ORDER — METOPROLOL SUCCINATE 25 MG/1
12.5 TABLET, EXTENDED RELEASE ORAL
Status: DISCONTINUED | OUTPATIENT
Start: 2023-04-23 | End: 2023-04-25 | Stop reason: HOSPADM

## 2023-04-23 RX ADMIN — MEXILETINE HYDROCHLORIDE 150 MG: 150 CAPSULE ORAL at 08:55

## 2023-04-23 RX ADMIN — CEFTRIAXONE 1 G: 1 INJECTION, POWDER, FOR SOLUTION INTRAMUSCULAR; INTRAVENOUS at 08:55

## 2023-04-23 RX ADMIN — ATORVASTATIN CALCIUM 80 MG: 40 TABLET, FILM COATED ORAL at 15:47

## 2023-04-23 RX ADMIN — Medication 5 MG: at 21:23

## 2023-04-23 RX ADMIN — Medication 10 ML: at 21:24

## 2023-04-23 RX ADMIN — APIXABAN 5 MG: 5 TABLET, FILM COATED ORAL at 21:23

## 2023-04-23 RX ADMIN — PANTOPRAZOLE SODIUM 40 MG: 40 TABLET, DELAYED RELEASE ORAL at 05:17

## 2023-04-23 RX ADMIN — AZITHROMYCIN 250 MG: 500 INJECTION, POWDER, LYOPHILIZED, FOR SOLUTION INTRAVENOUS at 09:41

## 2023-04-23 RX ADMIN — METOPROLOL SUCCINATE 12.5 MG: 25 TABLET, EXTENDED RELEASE ORAL at 15:47

## 2023-04-23 RX ADMIN — FOLIC ACID 1 MG: 1 TABLET ORAL at 08:55

## 2023-04-23 RX ADMIN — FERROUS SULFATE TAB EC 324 MG (65 MG FE EQUIVALENT) 324 MG: 324 (65 FE) TABLET DELAYED RESPONSE at 08:55

## 2023-04-23 RX ADMIN — Medication 10 ML: at 08:55

## 2023-04-23 RX ADMIN — TRAZODONE HYDROCHLORIDE 50 MG: 50 TABLET ORAL at 21:23

## 2023-04-23 RX ADMIN — TAMSULOSIN HYDROCHLORIDE 0.8 MG: 0.4 CAPSULE ORAL at 21:23

## 2023-04-23 RX ADMIN — MEXILETINE HYDROCHLORIDE 150 MG: 150 CAPSULE ORAL at 15:47

## 2023-04-23 RX ADMIN — APIXABAN 5 MG: 5 TABLET, FILM COATED ORAL at 08:55

## 2023-04-23 RX ADMIN — BUPROPION HYDROCHLORIDE 150 MG: 150 TABLET, EXTENDED RELEASE ORAL at 08:55

## 2023-04-23 RX ADMIN — MEXILETINE HYDROCHLORIDE 150 MG: 150 CAPSULE ORAL at 21:23

## 2023-04-23 NOTE — PROGRESS NOTES
"RENAL/KCC:     LOS: 0 days    Patient Care Team:  Rigo De Los Santos MD as PCP - General (Family Medicine)    Chief Complaint:  BRET/CKD    Subjective     Interval History:   Chart reviewed, discussed with nurse  More alert but still fairly lethargic  Not allowing labs    Objective     Vital Sign Min/Max for last 24 hours  Temp  Min: 97.2 °F (36.2 °C)  Max: 97.3 °F (36.3 °C)   BP  Min: 112/87  Max: 135/107   Pulse  Min: 83  Max: 90   Resp  Min: 18  Max: 27   SpO2  Min: 90 %  Max: 96 %   Flow (L/min)  Min: 4  Max: 5   Weight  Min: 66.7 kg (147 lb 0.8 oz)  Max: 67.8 kg (149 lb 7.6 oz)     Flowsheet Rows    Flowsheet Row First Filed Value   Admission Height 167.6 cm (66\") Documented at 04/22/2023 0555   Admission Weight 72.6 kg (160 lb) Documented at 04/22/2023 0555          I/O this shift:  In: 350 [P.O.:350]  Out: -   I/O last 3 completed shifts:  In: 250 [IV Piggyback:250]  Out: 300 [Urine:300]    Physical Exam:  GEN: Awake, NAD  ENT: PERRL, EOMI, MMM  NECK: Supple, no JVD  CHEST: CTAB, no W/R/C  CV: RRR, no M/G/R  ABD: Soft, NT, +BS  SKIN: Warm and Dry  NEURO: CN's intact      WBC WBC   Date Value Ref Range Status   04/23/2023 4.00 3.40 - 10.80 10*3/mm3 Final   04/22/2023 5.00 3.40 - 10.80 10*3/mm3 Final      HGB Hemoglobin   Date Value Ref Range Status   04/23/2023 13.4 13.0 - 17.7 g/dL Final   04/22/2023 14.5 13.0 - 17.7 g/dL Final      HCT Hematocrit   Date Value Ref Range Status   04/23/2023 43.7 37.5 - 51.0 % Final   04/22/2023 45.4 37.5 - 51.0 % Final      Platlets No results found for: LABPLAT   MCV MCV   Date Value Ref Range Status   04/23/2023 94.4 79.0 - 97.0 fL Final   04/22/2023 91.7 79.0 - 97.0 fL Final          Sodium Sodium   Date Value Ref Range Status   04/22/2023 146 (H) 136 - 145 mmol/L Final      Potassium Potassium   Date Value Ref Range Status   04/22/2023 4.5 3.5 - 5.2 mmol/L Final     Comment:     Slight hemolysis detected by analyzer. Results may be affected.      Chloride Chloride "   Date Value Ref Range Status   04/22/2023 106 98 - 107 mmol/L Final      CO2 CO2   Date Value Ref Range Status   04/22/2023 22.0 22.0 - 29.0 mmol/L Final      BUN BUN   Date Value Ref Range Status   04/22/2023 76 (H) 8 - 23 mg/dL Final      Creatinine Creatinine   Date Value Ref Range Status   04/22/2023 3.54 (H) 0.76 - 1.27 mg/dL Final      Calcium Calcium   Date Value Ref Range Status   04/22/2023 10.4 8.6 - 10.5 mg/dL Final      PO4 No results found for: CAPO4   Albumin Albumin   Date Value Ref Range Status   04/22/2023 3.5 3.5 - 5.2 g/dL Final      Magnesium Magnesium   Date Value Ref Range Status   04/22/2023 2.2 1.6 - 2.4 mg/dL Final      Uric Acid No results found for: URICACID        Results Review:     I reviewed the patient's new clinical results.    albuterol, 2.5 mg, Nebulization, Q6H - RT  apixaban, 5 mg, Oral, BID  atorvastatin, 80 mg, Oral, Q PM  azithromycin, 250 mg, Intravenous, Q24H  buPROPion SR, 150 mg, Oral, Daily  cefTRIAXone, 1 g, Intravenous, Daily  ferrous sulfate, 324 mg, Oral, Daily With Breakfast  folic acid, 1 mg, Oral, Daily  mexiletine, 150 mg, Oral, TID  pantoprazole, 40 mg, Oral, Q AM  polyethylene glycol, 17 g, Oral, Daily  sodium chloride, 10 mL, Intravenous, Q12H  tamsulosin, 0.8 mg, Oral, Nightly  traZODone, 50 mg, Oral, Nightly           Medication Review: Reviewed    Assessment & Plan     1) BRET vs CKD progression  2) CKD4 - Cr 2.8 in January - now in the mid 3's  3) Urinary retention  4) Dysuria  5) CHF  6) PNA  7) HTN    Plan: Encouraged patient to allow labs.  Volume status looks stable today.  ABX per primary.  Family trying to obtain POA.  [facility]  Discussed possible need of dialysis.  Avoid nephrotoxins.  Straight cath prn if patient allows.  Will follow.      Tho Pires MD   [facility]  04/23/23  10:02 EDT

## 2023-04-23 NOTE — PLAN OF CARE
Goal Outcome Evaluation:   Pt denies any pain or discomfort and no s/s of pain noted.  Pt remains confused to time, place, and situation.  Bed alarm on a audible.  Will continue to monitor pt status.

## 2023-04-23 NOTE — CASE MANAGEMENT/SOCIAL WORK
Continued Stay Note   [facility]     Patient Name: Seth Marie  MRN: [id number]  Today's Date: 4/23/2023    Admit Date: 4/22/2023    Plan: from [facility].   Discharge Plan     Row Name 04/23/23 1859       Plan    Plan from [facility].    Plan Comments cm received notice that pt's [facility].  At that time, pt was obs and discussed that transfers were not offered for obs patients. [facility] is also not [facility] as a payor on the patient's accoutn.  email sent to  for clarification, but no response received.  Pt's sister stated that [facility] but had a history of drug abuse and came to [place] as homeless and was at the [facility].  Sister desiring for POA, discussed that as he is currently not A&O, he is not able. Guardianship can be obtained through the courts.  she stated that [facility] was supposed to be working on guardianship, but she wants him close to her in [place].  Discussed her ability to provide 24/7 care, but she also stated she had looked into [facility] facilities [facility].  she reported that pt is  with 4 children but they do not have alot to do with the patient.  discussed that [place] law stated that the majority of children would be healthcare deicion maker if need should arise.  sister stated that pt's 30 days at [facility] is up at the end of the month and she doesn't want him homeless again.  added to [facility] inbasket for them to follow.                Expected Discharge Date and Time     Expected Discharge Date Expected Discharge Time    Apr 24, 2023             Shannan Pereira RN

## 2023-04-23 NOTE — PLAN OF CARE
Goal Outcome Evaluation:  Plan of Care Reviewed With: patient        Progress: no change  Outcome Evaluation: Patient shows no s/s of distress and vitals are stable. Pt voiced no concerns. Patient is alert and oriented to his self only and is refusing labs and keeps taking off his oxygen, MD notified. Bed alarm on and call light within reach. Will continue to observe.

## 2023-04-23 NOTE — NURSING NOTE
Med: ONETOUCH VERIOR STRP  Dosage: ?  Sig:  ?  Quantity requested:  100  Form Requested: no  ID Number:  [id number]  Insurance Phone Number: [phone]  Preferred pharmacy has been set up and verified.     Pt pulled out IV while he was sleeping.  Pt refusing to let nurse attempt new IV site at this time.

## 2023-04-23 NOTE — DISCHARGE PLACEMENT REQUEST
"Tracy Solorzano (63 y.o. Male)     Date of Birth   1960    Social Security Number       Address   [address] [place] [address]    Home Phone   [phone]    MRN   [id number]       Gnosticist   None    Marital Status   Single                            Admission Date   4/22/23    Admission Type   Emergency    Admitting Provider   Bernard Christensen MD    Attending Provider   Bernard Christensen MD    Department, Room/Bed   [facility] 3C MEDICAL INPATIENT, 357/1       Discharge Date       Discharge Disposition       Discharge Destination                               Attending Provider: Bernard Christensen MD    Allergies: Pork-derived Products, Protonix [Pantoprazole]    Isolation: Contact   Infection: COVID (rule out) (04/22/23), Rhea Auris (rule out) (04/22/23)   Code Status: CPR    Ht: 167.6 cm (66\")   Wt: 67.8 kg (149 lb 7.6 oz)    Admission Cmt: None   Principal Problem: Multifocal pneumonia [J18.9]                 Active Insurance as of 4/22/2023     Primary Coverage     Payor Plan Insurance Group Employer/Plan Group    HUMANA MEDICARE REPLACEMENT [facility]NA [facility] MEDIC[facility] REPLAC[facility] NON PAR [id number]     Payor Plan Address Payor Plan Phone Number Payor Plan Fax Number Effective Dates       1/1/2023 - None Entered    Subscriber Name Subscriber Birth Date Member ID       TRACY SOLORZANO 1960 [id number]                 Emergency Contacts      (Rel.) Home Phone Work Phone Mobile Phone    KEYS,CARLOS (Sister) [phone] -- [phone]               History & Physical      Miladis Batres APRN at 04/22/23 0832     Attestation signed by Bernard Christensen MD at 04/22/23 1403    Agree with APRN note.  Patient in renal failure we will consult nephrology.  Patient is quite labored likely fluid overloaded 2+ bilateral lower extremity edema.  Will likely need diuresis and careful monitoring of creatinine.  Cardiology consulted as well.                    [facility] " Medicine Services  History & Physical    Patient Name: Seth Marie  : 1960  MRN: [id number]  Primary Care Physician:  Rigo De Los Santos MD  Date of admission: 2023  Date and Time of Service: 2023 at 0 830    Subjective       Chief Complaint: Shortness of breath    History of Present Illness: Seth Marie is a 63 y.o. male with chronic O2 use at 3 to 6 L, hypertension, HFrEF, ventricular tachycardia/nonischemic cardiomyopathy with dual-chamber AICD in situ, paroxysmal atrial fibrillation, CKD 4, hyperlipidemia, previous alcohol dependence, depressive disorder, previous fungal meningitis, and dementia who currently resides at [facility] who presented to [facility] on 2023 with complaints of increased shortness of breath overnight.  Patient unable to give any meaningful history, [facility] and [facility] of breath for 30 minutes last night.  Report from emergency room physician assistant states that patient is reportedly at his baseline mental status as per nursing facility, arrived on 8 L nonrebreather but is currently only requiring 4 L.    His chest x-ray demonstrates patchy opacities consistent with pulmonary edema and/or pneumonia, white count normal, lactic acid and blood cultures are pending, he has been given Zithromax and Rocephin in the emergency department.  BNP elevated at 40,715, troponin level elevated at 155, EKG normal sinus rhythm with nonspecific lateral ST abnormality.  His creatinine is 3.5 and BUN 76 which is comparable to previous kidney function obtained 3/12/2023.  AST is elevated at 52, was elevated 1 month ago at 104.      Review of Systems   Unable to perform ROS: dementia        Personal History     Past Medical History:   Diagnosis Date   • Adjustment disorder    • Alcohol dependence    • Anxiety    • Atrial fibrillation    • CHF (congestive heart failure)    • CKD (chronic kidney disease) stage 4, GFR 15-29  ml/min    • Dementia    • Depression    • Encephalopathy    • GERD (gastroesophageal reflux disease)    • Hepatitis C    • Hyperlipidemia    • Hypertension        History reviewed. No pertinent surgical history.    Family History: family history is not on file. Otherwise pertinent FHx was reviewed and not pertinent to current issue.    Social History:      Home Medications:  Prior to Admission Medications     None            Allergies:  Allergies   Allergen Reactions   • Pork-Derived Products Unknown - Low Severity   • Protonix [Pantoprazole] Unknown - Low Severity       Objective       Vitals:   Temp:  [97.5 °F (36.4 °C)] 97.5 °F (36.4 °C)  Heart Rate:  [82-88] 84  Resp:  [20] 20  BP: (136-147)/(103-112) 136/110  Flow (L/min):  [4-8] 4    Physical Exam  Cardiovascular:      Rate and Rhythm: Normal rate and regular rhythm.      Pulses: Normal pulses.      Heart sounds: No murmur heard.  Pulmonary:      Effort: Pulmonary effort is normal.      Breath sounds: Rales (Bilateral lower lobes) present.      Comments: O2 at 4 liters  Abdominal:      General: Bowel sounds are normal. There is no distension.   Musculoskeletal:      Right lower leg: No edema.      Left lower leg: No edema.   Skin:     General: Skin is warm and dry.   Neurological:      Mental Status: He is alert.   Psychiatric:         Mood and Affect: Mood normal.            Result Review    Result Review:  I have personally reviewed the results from the time of this admission to 4/22/2023 08:33 EDT and agree with these findings:  [x]  Laboratory  [x]  Microbiology  [x]  Radiology chest x-ray independently reviewed and interpreted  [x]  EKG/Telemetry EKG independently reviewed and interpreted  []  Cardiology/Vascular   []  Pathology  [x]  Old records  []  Other:  Most notable findings include:  (See history of present illness and plan)      Assessment & Plan        Active Hospital Problems:  Active Hospital Problems    Diagnosis    • **Multifocal pneumonia       Plan:   #Shortness of breath  #Pneumonia, community acquired  -Rocephin and Zithromax IV given in emergency department, continue for now  -Follow CBC  -Follow lactic acid and blood cultures  -Check viral panel  -Check urine Legionella and strep pneumococcal    #Acute on chronic systolic heart failure, HFrEF  #CardioMEMs placed 12/2022  #Chronic O2 at 3 to 6 L  -Currently requiring 4 L  -Maintain O2 sat 88 to 92%  -Give Bumex 1 mg IV now    #Elevated troponin, initial level 155  #Elevated LFTs  -Trend troponins, CMP  -Review of left heart cath 12/21/2022 at [facility] demonstrated nonobstructive CAD    #Nonischemic cardiomyopathy  #Ventricular tachycardia, paroxysmal atrial fibrillation  -Dual-chamber AICD in situ   -Review home medications after reconciled, resume suppressant medications    #CKD 4, baseline creatinine 3.5-3.8  -Follow CMP    #HTN  -Resume home medications after reconciled  -Hypertensive on admission, Apresoline as needed    #Hyperlipidemia  -Resume home medications after reconciled    #Dementia  #Previous EtOH dependence  #Depressive disorder  #Previous fungal meningitis  -Anticipate return to nursing home at discharge  -Reported mental status at baseline  -Resume home medications after reconciled      DVT prophylaxis:  No DVT prophylaxis order currently exists.    CODE STATUS:       Admission Status:  I believe this patient meets observation status.    I discussed the patient's findings and my recommendations with patient.    This patient has been examined wearing the appropriate protective equipment  Signature: Electronically signed by IRINEO Longoria, 04/22/23, 08:33 EDT.  [facility] Hospitalist Team    Electronically signed by Bernard Christensen MD at 04/22/23 1403       Physician Progress Notes (last 24 hours)  Notes from 04/22/23 0854 through 04/23/23 0854   No notes of this type exist for this encounter.

## 2023-04-23 NOTE — PROGRESS NOTES
"    [facility] Medicine Services   Daily Progress Note    Patient Name: Seth Marie  : 1960  MRN: [id number]  Primary Care Physician:  Rigo De Los Santos MD  Date of admission: 2023  Date and Time of Service: 2023 at 0900      Subjective      Chief Complaint: Shortness of breath      Brief Patient Summary:  Seth Marie is a 63 y.o. male admitted from SNF with increased oxygen requirements, antibiotics initiated for potential pneumonia, all markers negative for sepsis and atypical infection, white count normal, chest x-ray infiltrates and edema slightly improved from yesterday, Zithromax discontinued.  Cardiology following for CHF exacerbation, echo with severe mitral and tricuspid regurgitation.  Nephrology following for BRET on CKD IV, diuresis on hold for now with stable oxygen requirements at his baseline.  Sister attempting to obtain POA as patient has dementia, and [facility]    Patient Reports \"doing okay\"    Subjective:  Symptoms:  No shortness of breath or chest pain.    Diet:  No nausea.    Pain:  He reports no pain.          Objective      Vitals:   Temp:  [97.2 °F (36.2 °C)-97.3 °F (36.3 °C)] 97.2 °F (36.2 °C)  Heart Rate:  [84-94] 84  Resp:  [18-27] 24  BP: (112-135)/() 135/107  Flow (L/min):  [4-5] 4  Objective:  General Appearance:  Comfortable.    Vital signs: (most recent): Blood pressure 110/67, pulse 82, temperature 97.2 °F (36.2 °C), temperature source Axillary, resp. rate 28, height 167.6 cm (66\"), weight 67.8 kg (149 lb 7.6 oz), SpO2 93 %.    Lungs:  Normal effort and normal respiratory rate.  There are rales (Right lower lobe).  (O2 at 4 L)  Heart: Normal rate.  Regular rhythm.  Positive for murmur.    Abdomen: Abdomen is soft and non-distended.    Extremities: There is no dependent edema.    Neurological: Patient is alert.    Skin:  Warm and dry.                    Result Review    Result Review:  I have personally reviewed " the results from the time of this admission to 4/23/2023 07:53 EDT and agree with these findings:  [x]  Laboratory  [x]  Microbiology  [x]  Radiology  []  EKG/Telemetry   [x]  Cardiology/Vascular   []  Pathology  []  Old records  []  Other:  Most notable findings include: See summary and plan          Assessment & Plan        albuterol, 2.5 mg, Nebulization, Q6H - RT  apixaban, 5 mg, Oral, BID  atorvastatin, 80 mg, Oral, Q PM  azithromycin, 250 mg, Intravenous, Q24H  buPROPion SR, 150 mg, Oral, Daily  cefTRIAXone, 1 g, Intravenous, Daily  ferrous sulfate, 324 mg, Oral, Daily With Breakfast  folic acid, 1 mg, Oral, Daily  mexiletine, 150 mg, Oral, TID  pantoprazole, 40 mg, Oral, Q AM  polyethylene glycol, 17 g, Oral, Daily  sodium chloride, 10 mL, Intravenous, Q12H  tamsulosin, 0.8 mg, Oral, Nightly  traZODone, 50 mg, Oral, Nightly             Active Hospital Problems:  Active Hospital Problems    Diagnosis    • **Multifocal pneumonia      Plan:   #Shortness of breath  #Pneumonia, community acquired  -Rocephin IV   -Follow CBC----white count normal  -Lactic acid normal, viral panel negative  -Urine Legionella and strep pneumococcal negative, Zithromax discontinued  -Repeat chest x-ray in a.m.     #Acute on chronic systolic heart failure, HFrEF, EF 20%  #CardioMEMs placed 12/2022  #Chronic O2 at 3 to 6 L  -Currently requiring 4 L  -Maintain O2 sat 88 to 92%  -Cardiology following  -Echocardiogram with severe MR and TR     #Elevated troponin, initial level 155  #Elevated LFTs  -Troponins trending down  -LFTs trending down  -Review of left heart cath 12/21/2022 at [facility] demonstrated nonobstructive CAD     #Nonischemic cardiomyopathy, EF 20%  #Ventricular tachycardia, paroxysmal atrial fibrillation  -Dual-chamber AICD in situ   -Resume home meds    #BRET on CKD 4, baseline creatinine 3.5-3.8  -Follow CMP----creatinine stable at 3.5  -Nephrology following     #HTN  -Toprol XL 12.5 mg daily for now  -Periods of hypotension  noted, continue holding home antihypertensives  -Hypertensive on admission, Apresoline as needed     #Hyperlipidemia  -Statin    #Dementia  #Previous EtOH dependence  #Depressive disorder  #Previous fungal meningitis  -Anticipate return to nursing home at discharge  -Reported mental status at baseline  -Resume home medications      DVT prophylaxis:  Medical and mechanical DVT prophylaxis orders are present.    CODE STATUS:         Disposition:  I expect patient to be discharged 2 days.    Plan of care discussed with bedside nurse    This patient has been examined wearing appropriate Personal Protective Equipment   Electronically signed by IRINEO Longoria, 04/23/23, 07:53 EDT.  [facility] Hospitalist Team

## 2023-04-23 NOTE — PROGRESS NOTES
Referring Provider: Bernard Christensen MD    Reason for follow-up: Heart failure exacerbation, elevated troponin, cardiomyopathy     Patient Care Team:  Rigo De Los Santos MD as PCP - General (Family Medicine)      SUBJECTIVE  Does not have any specific complaints however reports shortness of breath and excessive somnolence     ROS  Review of all systems negative except as indicated.    Since I have last seen, the patient has been without any chest discomfort, shortness of breath, palpitations, dizziness or syncope.  Denies having any headache, abdominal pain, nausea, vomiting, diarrhea, constipation, loss of weight or loss of appetite.  Denies having any excessive bruising, hematuria or blood in the stool.  ROS      Personal History:    Past Medical History:   Diagnosis Date   • Adjustment disorder    • Alcohol dependence    • Anxiety    • Atrial fibrillation    • CHF (congestive heart failure)    • CKD (chronic kidney disease) stage 4, GFR 15-29 ml/min    • Dementia    • Depression    • Elevated cholesterol    • Encephalopathy    • GERD (gastroesophageal reflux disease)    • Hepatitis C    • Hyperlipidemia    • Hypertension        Past Surgical History:   Procedure Laterality Date   • PACEMAKER REPLACEMENT         History reviewed. No pertinent family history.    Social History     Tobacco Use   • Smoking status: Former     Types: Cigarettes   Vaping Use   • Vaping Use: Never used   Substance Use Topics   • Alcohol use: Not Currently   • Drug use: Not Currently     Types: Heroin        Medications Prior to Admission   Medication Sig Dispense Refill Last Dose   • albuterol sulfate  (90 Base) MCG/ACT inhaler Inhale 2 puffs Every 4 (Four) Hours As Needed for Wheezing.      • apixaban (ELIQUIS) 5 MG tablet tablet Take 1 tablet by mouth 2 (Two) Times a Day.      • atorvastatin (LIPITOR) 80 MG tablet Take 1 tablet by mouth Every Evening.      • buPROPion SR (WELLBUTRIN SR) 150 MG 12 hr tablet Take 1 tablet by  mouth Daily.      • carvedilol (COREG) 6.25 MG tablet Take 1 tablet by mouth 2 (Two) Times a Day With Meals.      • Cholecalciferol 25 MCG (1000 UT) tablet Take 2 tablets by mouth Daily.      • ferrous sulfate 325 (65 FE) MG tablet Take 1 tablet by mouth Daily With Breakfast.      • finasteride (PROSCAR) 5 MG tablet Take 1 tablet by mouth Daily.      • folic acid (FOLVITE) 1 MG tablet Take 1 tablet by mouth Daily.      • furosemide (LASIX) 40 MG tablet Take 1 tablet by mouth Daily.      • hydrALAZINE (APRESOLINE) 10 MG tablet Take 1 tablet by mouth 3 (Three) Times a Day.      • ipratropium-albuterol (DUO-NEB) 0.5-2.5 mg/3 ml nebulizer Take 3 mL by nebulization Every 4 (Four) Hours As Needed for Wheezing.      • isosorbide dinitrate (ISORDIL) 10 MG tablet Take 1 tablet by mouth 3 (Three) Times a Day.      • Lidocaine 4 % patch Apply 2 patches topically Every Morning.      • Menthol-Zinc Oxide (CALMOSEPTINE EX) Apply 1 application topically 2 (Two) Times a Day.      • mexiletine (MEXITIL) 150 MG capsule Take 1 capsule by mouth 3 (Three) Times a Day.      • naloxone (NARCAN) 4 MG/0.1ML nasal spray 1 spray into the nostril(s) as directed by provider As Needed.      • nitroglycerin (NITROLINGUAL) 0.4 MG/SPRAY spray Place 1 spray under the tongue Every 5 (Five) Minutes As Needed for Chest Pain.      • omeprazole (priLOSEC) 20 MG capsule Take 1 capsule by mouth Daily.      • polyethylene glycol (MIRALAX) 17 g packet Take 17 g by mouth Daily.      • sodium bicarbonate 650 MG tablet Take 2 tablets by mouth 2 (Two) Times a Day.      • tamsulosin (FLOMAX) 0.4 MG capsule 24 hr capsule Take 2 capsules by mouth Every Night.      • traZODone (DESYREL) 50 MG tablet Take 1 tablet by mouth Every Night.          Allergies:  Pork-derived products and Protonix [pantoprazole]    Scheduled Meds:albuterol, 2.5 mg, Nebulization, Q6H - RT  apixaban, 5 mg, Oral, BID  atorvastatin, 80 mg, Oral, Q PM  buPROPion SR, 150 mg, Oral,  "Daily  cefTRIAXone, 1 g, Intravenous, Daily  ferrous sulfate, 324 mg, Oral, Daily With Breakfast  folic acid, 1 mg, Oral, Daily  metoprolol succinate XL, 12.5 mg, Oral, Q24H  mexiletine, 150 mg, Oral, TID  pantoprazole, 40 mg, Oral, Q AM  polyethylene glycol, 17 g, Oral, Daily  sodium chloride, 10 mL, Intravenous, Q12H  tamsulosin, 0.8 mg, Oral, Nightly  traZODone, 50 mg, Oral, Nightly      Continuous Infusions:   PRN Meds:.•  acetaminophen **OR** acetaminophen **OR** acetaminophen  •  hydrALAZINE  •  magnesium sulfate **OR** magnesium sulfate **OR** magnesium sulfate  •  melatonin  •  nitroglycerin  •  ondansetron **OR** ondansetron  •  potassium chloride  •  potassium chloride  •  sodium chloride  •  sodium chloride  •  sodium chloride  •  traMADol      OBJECTIVE    Vital Signs  Vitals:    04/23/23 0100 04/23/23 0425 04/23/23 0903 04/23/23 1100   BP: 123/91 (!) 135/107 132/93 110/67   BP Location: Right arm Left arm Right arm Left arm   Patient Position: Lying Lying Sitting Sitting   Pulse: 88 84 83 82   Resp: 21 24 24 28   Temp:       TempSrc:       SpO2: 93% 90% 96% 93%   Weight:  67.8 kg (149 lb 7.6 oz)     Height:           Flowsheet Rows    Flowsheet Row First Filed Value   Admission Height 167.6 cm (66\") Documented at 04/22/2023 0555   Admission Weight 72.6 kg (160 lb) Documented at 04/22/2023 0555            Intake/Output Summary (Last 24 hours) at 4/23/2023 1408  Last data filed at 4/23/2023 0900  Gross per 24 hour   Intake 350 ml   Output 300 ml   Net 50 ml          Telemetry: Sinus rhythm    Physical Exam:  The patient is very somnolent and disoriented  Vital signs as noted above.  Head and neck revealed no carotid bruits or jugular venous distention.  No thyromegaly or lymphadenopathy is present  Lungs clear.  No wheezing.  Breath sounds are normal bilaterally.  Heart normal first and second heart sounds.  No murmur. No precordial rub is present.  No gallop is present.  Abdomen soft and nontender.  No " organomegaly is present.  Extremities with good peripheral pulses without any pedal edema.  Skin warm and dry.  Musculoskeletal system is grossly normal.  CNS grossly normal.       Results Review:  I have personally reviewed the results from the time of this admission to 4/23/2023 14:08 EDT and agree with these findings:  []  Laboratory  []  Microbiology  []  Radiology  []  EKG/Telemetry   []  Cardiology/Vascular   []  Pathology  []  Old records  []  Other:    Most notable findings include:    Lab Results (last 24 hours)     Procedure Component Value Units Date/Time    Phosphorus [[id number]]  (Normal) Collected: 04/23/23 0917    Specimen: Blood Updated: 04/23/23 1022     Phosphorus 4.1 mg/dL     High Sensitivity Troponin T [[id number]]  (Abnormal) Collected: 04/23/23 0917    Specimen: Blood Updated: 04/23/23 1022     HS Troponin T 130 ng/L     Narrative:      High Sensitive Troponin T Reference Range:  <10.0 ng/L- Negative Female for AMI  <15.0 ng/L- Negative Male for AMI  >=10 - Abnormal Female indicating possible myocardial injury.  >=15 - Abnormal Male indicating possible myocardial injury.   Clinicians would have to utilize clinical acumen, EKG, Troponin, and serial changes to determine if it is an Acute Myocardial Infarction or myocardial injury due to an underlying chronic condition.         Procalcitonin [[id number]]  (Normal) Collected: 04/23/23 0917    Specimen: Blood Updated: 04/23/23 1017     Procalcitonin 0.24 ng/mL     Narrative:      As a Marker for Sepsis (Non-Neonates):    1. <0.5 ng/mL represents a low risk of severe sepsis and/or septic shock.  2. >2 ng/mL represents a high risk of severe sepsis and/or septic shock.    As a Marker for Lower Respiratory Tract Infections that require antibiotic therapy:    PCT on Admission    Antibiotic Therapy       6-12 Hrs later    >0.5                Strongly Recommended  >0.25 - <0.5        Recommended   0.1 - 0.25          Discouraged               "Remeasure/reassess PCT  <0.1                Strongly Discouraged     Remeasure/reassess PCT    As 28 day mortality risk marker: \"Change in Procalcitonin Result\" (>80% or <=80%) if Day 0 (or Day 1) and Day 4 values are available. Refer to http://[email or web address]-pct-calculator.[email or web address]    Change in PCT <=80%  A decrease of PCT levels below or equal to 80% defines a positive change in PCT test result representing a higher risk for 28-day all-cause mortality of patients diagnosed with severe sepsis for septic shock.    Change in PCT >80%  A decrease of PCT levels of more than 80% defines a negative change in PCT result representing a lower risk for 28-day all-cause mortality of patients diagnosed with severe sepsis or septic shock.       Magnesium [[id number]]  (Normal) Collected: 04/23/23 0917    Specimen: Blood Updated: 04/23/23 1013     Magnesium 2.3 mg/dL     Comprehensive Metabolic Panel [[id number]]  (Abnormal) Collected: 04/23/23 0917    Specimen: Blood Updated: 04/23/23 1013     Glucose 105 mg/dL      BUN 78 mg/dL      Creatinine 3.53 mg/dL      Sodium 147 mmol/L      Potassium 4.3 mmol/L      Chloride 110 mmol/L      CO2 21.0 mmol/L      Calcium 10.2 mg/dL      Total Protein 6.5 g/dL      Albumin 3.0 g/dL      ALT (SGPT) 27 U/L      AST (SGOT) 43 U/L      Alkaline Phosphatase 218 U/L      Total Bilirubin 1.2 mg/dL      Globulin 3.5 gm/dL      A/G Ratio 0.9 g/dL      BUN/Creatinine Ratio 22.1     Anion Gap 16.0 mmol/L      eGFR 18.4 mL/min/1.73     Narrative:      GFR Normal >60  Chronic Kidney Disease <60  Kidney Failure <15      CBC & Differential [[id number]]  (Abnormal) Collected: 04/23/23 0917    Specimen: Blood Updated: 04/23/23 0945    Narrative:      The following orders were created for panel order CBC & Differential.  Procedure                               Abnormality         Status                     ---------                               -----------         ------                     CBC Auto " Differential[[id number]]        Abnormal            Final result                 Please view results for these tests on the individual orders.    CBC Auto Differential [[id number]]  (Abnormal) Collected: 04/23/23 0917    Specimen: Blood Updated: 04/23/23 0945     WBC 4.00 10*3/mm3      RBC 4.63 10*6/mm3      Hemoglobin 13.4 g/dL      Hematocrit 43.7 %      MCV 94.4 fL      MCH 28.9 pg      MCHC 30.6 g/dL      RDW 19.9 %      RDW-SD 66.5 fl      MPV 9.0 fL      Platelets 160 10*3/mm3      Neutrophil % 77.5 %      Lymphocyte % 17.0 %      Monocyte % 4.5 %      Eosinophil % 0.7 %      Basophil % 0.3 %      Neutrophils, Absolute 3.10 10*3/mm3      Lymphocytes, Absolute 0.70 10*3/mm3      Monocytes, Absolute 0.20 10*3/mm3      Eosinophils, Absolute 0.00 10*3/mm3      Basophils, Absolute 0.00 10*3/mm3      nRBC 0.7 /100 WBC     Blood Culture - Blood, Arm, Left [[id number]]  (Normal) Collected: 04/22/23 0825    Specimen: Blood from Arm, Left Updated: 04/23/23 0845     Blood Culture No growth at 24 hours    Blood Culture - Blood, Arm, Left [[id number]]  (Normal) Collected: 04/22/23 0825    Specimen: Blood from Arm, Left Updated: 04/23/23 0830     Blood Culture No growth at 24 hours    Creatinine Urine Random (kidney function) GFR component - Urine, Clean Catch [[id number]] Collected: 04/22/23 1429    Specimen: Urine, Clean Catch Updated: 04/22/23 1949     Creatinine, Urine 97.0 mg/dL     Urinalysis, Microscopic Only - Straight Cath [[id number]]  (Abnormal) Collected: 04/22/23 1429    Specimen: Urine from Straight Cath Updated: 04/22/23 1546     RBC, UA None Seen /HPF      WBC, UA 0-2 /HPF      Comment: Urine culture not indicated.        Bacteria, UA None Seen /HPF      Squamous Epithelial Cells, UA 0-2 /HPF      Hyaline Casts, UA 3-6 /LPF      Methodology Manual Light Microscopy    S. Pneumo Ag Urine or CSF - Urine, Urine, Clean Catch [[id number]]  (Normal) Collected: 04/22/23 1429    Specimen: Urine, Clean Catch Updated:  04/22/23 1521     Strep Pneumo Ag Negative    Legionella Antigen, Urine - Urine, Urine, Clean Catch [[id number]]  (Normal) Collected: 04/22/23 1429    Specimen: Urine, Clean Catch Updated: 04/22/23 1521     LEGIONELLA ANTIGEN, URINE Negative    Sodium, Urine, Random - Urine, Clean Catch [[id number]] Collected: 04/22/23 1429    Specimen: Urine, Clean Catch Updated: 04/22/23 1515     Sodium, Urine 52 mmol/L     Narrative:      Reference intervals for random urine have not been established.  Clinical usage is dependent upon physician's interpretation in combination with other laboratory tests.       Chloride, Urine, Random - Urine, Clean Catch [[id number]] Collected: 04/22/23 1429    Specimen: Urine, Clean Catch Updated: 04/22/23 1515     Chloride, Urine 32 mmol/L     Narrative:      Reference intervals for random urine have not been established.  Clinical usage is dependent upon physician's interpretation in combination with other laboratory tests.       Protein, Urine, Random - Urine, Clean Catch [[id number]] Collected: 04/22/23 1429    Specimen: Urine, Clean Catch Updated: 04/22/23 1513     Total Protein, Urine 145.1 mg/dL     Narrative:      Reference intervals for random urine have not been established.  Clinical usage is dependent upon physician's interpretation in combination with other laboratory tests.       Urinalysis With Culture If Indicated - Straight Cath [[id number]]  (Abnormal) Collected: 04/22/23 1429    Specimen: Urine from Straight Cath Updated: 04/22/23 1450     Color, UA Yellow     Appearance, UA Clear     pH, UA 5.5     Specific Gravity, UA 1.015     Glucose, UA Negative     Ketones, UA Negative     Bilirubin, UA Negative     Blood, UA Negative     Protein, UA >=300 mg/dL (3+)     Leuk Esterase, UA Negative     Nitrite, UA Negative     Urobilinogen, UA 1.0 E.U./dL    Narrative:      In absence of clinical symptoms, the presence of pyuria, bacteria, and/or nitrites on the urinalysis result does not  correlate with infection.          Imaging Results (Last 24 Hours)     Procedure Component Value Units Date/Time    XR Chest 1 View [[id number]] Collected: 04/23/23 0845     Updated: 04/23/23 0849    Narrative:      XR CHEST 1 VW    Date of Exam: 4/23/2023 6:03 AM EDT    Indication: fluid overload    Comparison: 4/22/2023    Findings:  Left chest wall pacemaker is present. Cardiac silhouette is moderately enlarged. Pulmonary vasculature is indistinct. Bilateral airspace disease may represent edema or multifocal infiltrates. No significant pleural effusion or pneumothorax is seen. No   acute osseous lesion is seen.      Impression:      Impression:  Cardiomegaly with bilateral airspace disease which may represent edema or multifocal infiltrates. Findings are similar or slightly improved since 4/22/2023.      Electronically Signed: Mohinder Pérez    4/23/2023 8:47 AM EDT    Workstation ID: [id number]    US Renal Bilateral [[id number]] Collected: 04/22/23 1543     Updated: 04/22/23 1547    Narrative:      US RENAL BILATERAL    Date of Exam: 4/22/2023 3:24 PM EDT    Indication: BRET.    Comparison: No comparisons available.    Technique: Grayscale and color Doppler ultrasound evaluation of the kidneys and urinary bladder was performed.      Findings:  Technologist noted a technically difficult exam due to patient condition.    Right kidney measures 9.1 x 5.1 x 6.7 cm, and left kidney measures 9.6 x 5.2 x 5.6 cm. Renal cortical echogenicity appears increased. No hydronephrosis or obvious renal mass is seen.    Urinary bladder is incompletely distended with an estimated volume of 68.5 mL.    Ascites is incidentally noted.      Impression:      Impression:  1.Increased renal cortical echogenicity, which can be seen with chronic medical renal disease.  2.No hydronephrosis.  3.Incidental ascites.        Electronically Signed: Emily Webster    4/22/2023 3:45 PM EDT    Workstation ID: [id number]          LAB RESULTS (LAST 7  DAYS)    CBC  Results from last 7 days   Lab Units 04/23/23  0917 04/22/23  0618   WBC 10*3/mm3 4.00 5.00   RBC 10*6/mm3 4.63 4.94   HEMOGLOBIN g/dL 13.4 14.5   HEMATOCRIT % 43.7 45.4   MCV fL 94.4 91.7   PLATELETS 10*3/mm3 160 169       BMP  Results from last 7 days   Lab Units 04/23/23  0917 04/22/23 0618   SODIUM mmol/L 147* 146*   POTASSIUM mmol/L 4.3 4.5   CHLORIDE mmol/L 110* 106   CO2 mmol/L 21.0* 22.0   BUN mg/dL 78* 76*   CREATININE mg/dL 3.53* 3.54*   GLUCOSE mg/dL 105* 107*   MAGNESIUM mg/dL 2.3 2.2   PHOSPHORUS mg/dL 4.1 3.1       CMP   Results from last 7 days   Lab Units 04/23/23  0917 04/22/23 0618   SODIUM mmol/L 147* 146*   POTASSIUM mmol/L 4.3 4.5   CHLORIDE mmol/L 110* 106   CO2 mmol/L 21.0* 22.0   BUN mg/dL 78* 76*   CREATININE mg/dL 3.53* 3.54*   GLUCOSE mg/dL 105* 107*   ALBUMIN g/dL 3.0* 3.5   BILIRUBIN mg/dL 1.2 1.9*   ALK PHOS U/L 218* 265*   AST (SGOT) U/L 43* 52*   ALT (SGPT) U/L 27 33       BNP        TROPONIN  Results from last 7 days   Lab Units 04/23/23 0917   HSTROP T ng/L 130*       CoAg        Creatinine Clearance  Estimated Creatinine Clearance: 20 mL/min (A) (by C-G formula based on SCr of 3.53 mg/dL (H)).    ABG        Radiology  XR Chest 1 View    Result Date: 4/23/2023  Impression: Cardiomegaly with bilateral airspace disease which may represent edema or multifocal infiltrates. Findings are similar or slightly improved since 4/22/2023. Electronically Signed: Mohinder Pérez  4/23/2023 8:47 AM EDT  Workstation ID: [id number]    XR Chest 1 View    Result Date: 4/22/2023  Impression: Patchy airspace opacities throughout both lungs, concerning for multifocal pneumonia and/or pulmonary edema. Electronically Signed: Emily Webster  4/22/2023 7:33 AM EDT  Workstation ID: [id number]    US Renal Bilateral    Result Date: 4/22/2023  Impression: 1.Increased renal cortical echogenicity, which can be seen with chronic medical renal disease. 2.No hydronephrosis. 3.Incidental ascites.  Electronically Signed: Emily Webster  4/22/2023 3:45 PM EDT  Workstation ID: [id number]        EKG  I personally viewed and interpreted the patient's EKG/Telemetry data:  ECG 12 Lead Dyspnea   Final Result   HEART RATE= 88  bpm   RR Interval= 680  ms   OH Interval= 167  ms   P Horizontal Axis= 2  deg   P Front Axis= 49  deg   QRSD Interval= 110  ms   QT Interval= 433  ms   QRS Axis= -53  deg   T Wave Axis= 133  deg   - ABNORMAL ECG -   Sinus rhythm   LAD, consider left anterior fascicular block   Low voltage, extremity leads   Anteroseptal infarct, old   Nonspecific T abnormalities, lateral leads   Prolonged QT interval   No previous ECG available for comparison   Electronically Signed By: Tony Deleon (Dennis) 22-Apr-2023 10:27:58   Date and Time of Study: 2023-04-22 06:25:45      SCANNED - TELEMETRY     Final Result            Echocardiogram:    Results for orders placed during the hospital encounter of 04/22/23    Adult Transthoracic Echo Complete W/ Cont if Necessary Per Protocol    Interpretation Summary  •  Left ventricular systolic function is severely decreased. Calculated left ventricular EF = 15% Left ventricular ejection fraction appears to be less than 20%.  •  The left ventricular cavity is moderate to severely dilated.  •  Left ventricular diastolic dysfunction is noted.  •  Severely reduced right ventricular systolic function noted.  •  The right ventricular cavity is dilated.  •  The right atrial cavity is severely  dilated.  •  Severe mitral valve regurgitation is present.  •  Severe tricuspid valve regurgitation is present.  •  Estimated right ventricular systolic pressure from tricuspid regurgitation is markedly elevated (>55 mmHg).  •  Severe pulmonary hypertension is present.        Stress Test:         Cardiac Catheterization:  No results found for this or any previous visit.         Other:         ASSESSMENT & PLAN:    Principal Problem:    Multifocal pneumonia    CHF exacerbation/HFrEF  Previous  echocardiogram showed EF of 32% with end-diastolic dimension of 5 cm, severe pulmonary hypertension, severe TR RVSP 51 mmHg.  Previous cardiac catheterization in December 2022 showed nonobstructive coronary disease with slow flow phenomena.  Cardiac MRI with no evidence of amyloid  Restart Toprol-XL 25 mg p.o. daily  Unable to start ACE inhibitor, ARNI or SGLT2 due to renal dysfunction  Previous attempts were made for afterload reduction with hydralazine and Isordil.  We will resume if blood pressure allows  Repeat echo shows EF of 20%, severe biventricular failure with severe mitral and tricuspid regurgitation with severe pulmonary hypertension  Start IV diuretics.  Lasix 40 mg IV  Low threshold to offer a right heart cath for volume assessment  Previous right heart cath showed cardiac index of 1.6  He has CardioMEMS in place  He also has ICD  Consider palliative and hospice care consult due to end-stage cardiomyopathy and very poor prognosis     Pneumonia  He has been started on broad-spectrum antibiotics  Oxygen supplementation as needed     Atrial fibrillation  Continue full dose anticoagulation with Eliquis  Rate control with metoprolol XL     Hyperlipidemia  Restart high intensity statin     Chronic kidney disease  Poor renal function with GFR of less than 20.  Avoid nephrotoxins.  Likely due to cardiorenal.  Creatinine is 3.5, BUN is 78  Nephrology consultation  May end up on hemodialysis soon     Neuropathy  Continue gabapentin  Previous fungal meningitis and alcohol dependence      Ousmane Ho MD  04/23/23  14:08 EDT

## 2023-04-24 ENCOUNTER — APPOINTMENT (OUTPATIENT)
Dept: GENERAL RADIOLOGY | Facility: HOSPITAL | Age: 63
End: 2023-04-24
Payer: MEDICARE

## 2023-04-24 PROBLEM — I50.23 ACUTE ON CHRONIC SYSTOLIC HEART FAILURE: Status: ACTIVE | Noted: 2023-04-24

## 2023-04-24 LAB
ALBUMIN SERPL-MCNC: 2.8 G/DL (ref 3.5–5.2)
ALBUMIN/GLOB SERPL: 0.8 G/DL
ALP SERPL-CCNC: 199 U/L (ref 39–117)
ALT SERPL W P-5'-P-CCNC: 25 U/L (ref 1–41)
AMMONIA BLD-SCNC: <10 UMOL/L (ref 16–60)
ANION GAP SERPL CALCULATED.3IONS-SCNC: 18 MMOL/L (ref 5–15)
AST SERPL-CCNC: 41 U/L (ref 1–40)
BASOPHILS # BLD AUTO: 0 10*3/MM3 (ref 0–0.2)
BASOPHILS NFR BLD AUTO: 0.4 % (ref 0–1.5)
BILIRUB SERPL-MCNC: 1.1 MG/DL (ref 0–1.2)
BUN SERPL-MCNC: 80 MG/DL (ref 8–23)
BUN/CREAT SERPL: 21.2 (ref 7–25)
CALCIUM SPEC-SCNC: 9.7 MG/DL (ref 8.6–10.5)
CHLORIDE SERPL-SCNC: 107 MMOL/L (ref 98–107)
CO2 SERPL-SCNC: 16 MMOL/L (ref 22–29)
CREAT SERPL-MCNC: 3.78 MG/DL (ref 0.76–1.27)
DEPRECATED RDW RBC AUTO: 66.1 FL (ref 37–54)
EGFRCR SERPLBLD CKD-EPI 2021: 16.9 ML/MIN/1.73
EOSINOPHIL # BLD AUTO: 0 10*3/MM3 (ref 0–0.4)
EOSINOPHIL NFR BLD AUTO: 1.2 % (ref 0.3–6.2)
ERYTHROCYTE [DISTWIDTH] IN BLOOD BY AUTOMATED COUNT: 19.3 % (ref 12.3–15.4)
GEN 5 2HR TROPONIN T REFLEX: 120 NG/L
GLOBULIN UR ELPH-MCNC: 3.5 GM/DL
GLUCOSE SERPL-MCNC: 81 MG/DL (ref 65–99)
HCT VFR BLD AUTO: 36.9 % (ref 37.5–51)
HCT VFR BLD AUTO: 40.9 % (ref 37.5–51)
HGB BLD-MCNC: 11.1 G/DL (ref 13–17.7)
HGB BLD-MCNC: 12.7 G/DL (ref 13–17.7)
LYMPHOCYTES # BLD AUTO: 0.7 10*3/MM3 (ref 0.7–3.1)
LYMPHOCYTES NFR BLD AUTO: 18.6 % (ref 19.6–45.3)
MCH RBC QN AUTO: 28.4 PG (ref 26.6–33)
MCHC RBC AUTO-ENTMCNC: 30.9 G/DL (ref 31.5–35.7)
MCV RBC AUTO: 91.9 FL (ref 79–97)
MONOCYTES # BLD AUTO: 0.2 10*3/MM3 (ref 0.1–0.9)
MONOCYTES NFR BLD AUTO: 4.2 % (ref 5–12)
NEUTROPHILS NFR BLD AUTO: 2.9 10*3/MM3 (ref 1.7–7)
NEUTROPHILS NFR BLD AUTO: 75.6 % (ref 42.7–76)
NRBC BLD AUTO-RTO: 1.4 /100 WBC (ref 0–0.2)
PLATELET # BLD AUTO: 130 10*3/MM3 (ref 140–450)
PMV BLD AUTO: 9.5 FL (ref 6–12)
POTASSIUM SERPL-SCNC: 5 MMOL/L (ref 3.5–5.2)
PROT SERPL-MCNC: 6.3 G/DL (ref 6–8.5)
RBC # BLD AUTO: 4.46 10*6/MM3 (ref 4.14–5.8)
SODIUM SERPL-SCNC: 141 MMOL/L (ref 136–145)
TROPONIN T DELTA: -10 NG/L
WBC NRBC COR # BLD: 3.8 10*3/MM3 (ref 3.4–10.8)

## 2023-04-24 PROCEDURE — 94799 UNLISTED PULMONARY SVC/PX: CPT

## 2023-04-24 PROCEDURE — 85018 HEMOGLOBIN: CPT | Performed by: INTERNAL MEDICINE

## 2023-04-24 PROCEDURE — 84484 ASSAY OF TROPONIN QUANT: CPT | Performed by: NURSE PRACTITIONER

## 2023-04-24 PROCEDURE — 94664 DEMO&/EVAL PT USE INHALER: CPT

## 2023-04-24 PROCEDURE — 94640 AIRWAY INHALATION TREATMENT: CPT

## 2023-04-24 PROCEDURE — 85025 COMPLETE CBC W/AUTO DIFF WBC: CPT | Performed by: NURSE PRACTITIONER

## 2023-04-24 PROCEDURE — 80053 COMPREHEN METABOLIC PANEL: CPT | Performed by: NURSE PRACTITIONER

## 2023-04-24 PROCEDURE — 94761 N-INVAS EAR/PLS OXIMETRY MLT: CPT

## 2023-04-24 PROCEDURE — 82140 ASSAY OF AMMONIA: CPT | Performed by: NURSE PRACTITIONER

## 2023-04-24 PROCEDURE — 99232 SBSQ HOSP IP/OBS MODERATE 35: CPT | Performed by: INTERNAL MEDICINE

## 2023-04-24 PROCEDURE — 85014 HEMATOCRIT: CPT | Performed by: INTERNAL MEDICINE

## 2023-04-24 PROCEDURE — 71045 X-RAY EXAM CHEST 1 VIEW: CPT

## 2023-04-24 PROCEDURE — 0TJB8ZZ INSPECTION OF BLADDER, VIA NATURAL OR ARTIFICIAL OPENING ENDOSCOPIC: ICD-10-PCS | Performed by: INTERNAL MEDICINE

## 2023-04-24 PROCEDURE — 25010000002 CEFTRIAXONE PER 250 MG: Performed by: NURSE PRACTITIONER

## 2023-04-24 RX ORDER — TAMSULOSIN HYDROCHLORIDE 0.4 MG/1
0.8 CAPSULE ORAL NIGHTLY
Qty: 30 CAPSULE
Start: 2023-04-24

## 2023-04-24 RX ORDER — SODIUM BICARBONATE 650 MG/1
1300 TABLET ORAL 3 TIMES DAILY
Start: 2023-04-24

## 2023-04-24 RX ORDER — ALBUTEROL SULFATE 2.5 MG/3ML
2.5 SOLUTION RESPIRATORY (INHALATION) EVERY 6 HOURS PRN
Status: DISCONTINUED | OUTPATIENT
Start: 2023-04-24 | End: 2023-04-25 | Stop reason: HOSPADM

## 2023-04-24 RX ORDER — METOPROLOL SUCCINATE 25 MG/1
12.5 TABLET, EXTENDED RELEASE ORAL
Qty: 30 TABLET
Start: 2023-04-25

## 2023-04-24 RX ORDER — SODIUM BICARBONATE 650 MG/1
1300 TABLET ORAL 3 TIMES DAILY
Status: DISCONTINUED | OUTPATIENT
Start: 2023-04-24 | End: 2023-04-25 | Stop reason: HOSPADM

## 2023-04-24 RX ADMIN — SODIUM BICARBONATE 1300 MG: 650 TABLET ORAL at 21:51

## 2023-04-24 RX ADMIN — ALBUTEROL SULFATE 2.5 MG: 2.5 SOLUTION RESPIRATORY (INHALATION) at 00:05

## 2023-04-24 RX ADMIN — FOLIC ACID 1 MG: 1 TABLET ORAL at 08:38

## 2023-04-24 RX ADMIN — MEXILETINE HYDROCHLORIDE 150 MG: 150 CAPSULE ORAL at 08:37

## 2023-04-24 RX ADMIN — MEXILETINE HYDROCHLORIDE 150 MG: 150 CAPSULE ORAL at 15:54

## 2023-04-24 RX ADMIN — FERROUS SULFATE TAB EC 324 MG (65 MG FE EQUIVALENT) 324 MG: 324 (65 FE) TABLET DELAYED RESPONSE at 08:38

## 2023-04-24 RX ADMIN — Medication 10 ML: at 21:52

## 2023-04-24 RX ADMIN — ATORVASTATIN CALCIUM 80 MG: 40 TABLET, FILM COATED ORAL at 18:00

## 2023-04-24 RX ADMIN — CEFTRIAXONE 1 G: 1 INJECTION, POWDER, FOR SOLUTION INTRAMUSCULAR; INTRAVENOUS at 08:38

## 2023-04-24 RX ADMIN — POLYETHYLENE GLYCOL 3350 17 G: 17 POWDER, FOR SOLUTION ORAL at 08:38

## 2023-04-24 RX ADMIN — METOPROLOL SUCCINATE 12.5 MG: 25 TABLET, EXTENDED RELEASE ORAL at 08:38

## 2023-04-24 RX ADMIN — SODIUM BICARBONATE 1300 MG: 650 TABLET ORAL at 15:54

## 2023-04-24 RX ADMIN — APIXABAN 5 MG: 5 TABLET, FILM COATED ORAL at 21:51

## 2023-04-24 RX ADMIN — TRAMADOL HYDROCHLORIDE 50 MG: 50 TABLET, COATED ORAL at 09:02

## 2023-04-24 RX ADMIN — BUPROPION HYDROCHLORIDE 150 MG: 150 TABLET, EXTENDED RELEASE ORAL at 08:38

## 2023-04-24 RX ADMIN — TRAZODONE HYDROCHLORIDE 50 MG: 50 TABLET ORAL at 21:51

## 2023-04-24 RX ADMIN — APIXABAN 5 MG: 5 TABLET, FILM COATED ORAL at 08:38

## 2023-04-24 RX ADMIN — SODIUM BICARBONATE 1300 MG: 650 TABLET ORAL at 08:37

## 2023-04-24 RX ADMIN — TAMSULOSIN HYDROCHLORIDE 0.8 MG: 0.4 CAPSULE ORAL at 21:51

## 2023-04-24 RX ADMIN — TRAMADOL HYDROCHLORIDE 50 MG: 50 TABLET, COATED ORAL at 00:58

## 2023-04-24 RX ADMIN — MEXILETINE HYDROCHLORIDE 150 MG: 150 CAPSULE ORAL at 21:51

## 2023-04-24 RX ADMIN — Medication 10 ML: at 08:39

## 2023-04-24 RX ADMIN — ALBUTEROL SULFATE 2.5 MG: 2.5 SOLUTION RESPIRATORY (INHALATION) at 07:25

## 2023-04-24 NOTE — NURSING NOTE
Patient is continuously removing oxygen, pulse oximeter, and cardiac monitoring leads despite this nurse's education. Patient does have dementia and is only A&Ox1. Sometimes redirectable when reapproached but has not stopped the behaviors. Dr. Guevara contacted. Notified of patient behaviors and N.O. received for remote sitter.

## 2023-04-24 NOTE — OP NOTE
OP Note  Preop Dx: Clot urinary retention and John catheter trauma  Postop Dx: Dx same  Procedure:  Cystoscopy John catheter placement  Surgeon: Seth Carrillo  Anesthesia:  Local  Complications: None  Findings: Patient caused a lot of urethral trauma after pulling John catheter out.  The catheter had to be scoped in.  Leave the catheter in for a good week to let the urethra heal    Estimated blood loss 100 cc     Description of procedure:  Patient was at his bedside and his groin areas prepped draped usual sterile fashion after the patient had ripped his John catheter out of his body.  Flexible cystoscopy is carried out finding a normal urethra down to the level of prostatic urethra which was in bad shape due to the catheter being pulled out with a balloon inflated.  We did get the scope into the bladder and over a guidewire we placed a 18 Upper sorbian John catheter with clear urine upon irrigating.  The plan is above

## 2023-04-24 NOTE — CONSULTS
"Palliative Care Social Work Progress Note    Code Status:full code    Goals of Care: Full Treatment    Narrative: Palliative care  met with pt to assess for goals of care.  Pt unable to engage in goals of care conversation due to medical condition.  Pt's sister, Alina Romero, was called to discuss goals.  Sister reports that she is coming in town to see the pt and will be here to visit with him on Thursday.  She reports she would like to be able to get him to move closer to her, but she doesn't know if that's possible.  We discussed the pt's broader goals and sister reports that she does want to encourage him to get better, but is also open to the idea of hospice in the event he declines further.  Hospice services discussed.  Sister amenable to hospice referral to explore the option.  Agencies discussed and [facility] chosen for referral.  Emotional support provided.    Code status was also discussed.  She reports that she spoke with the [facility] \"[facility]\" and that he had noted the pt is a full code.  She agrees with this at the current moment.              Plan:  -[facility] hospice referral for explanation of services.  -Will continue to follow.          MARCEL Kirkland    "

## 2023-04-24 NOTE — DISCHARGE PLACEMENT REQUEST
"Tracy Solorzano (63 y.o. Male)     Date of Birth   1960    Social Security Number       Address   [address] [address]    Home Phone   [phone]    MRN   [id number]       Mormon   None    Marital Status   Single                            Admission Date   4/22/23    Admission Type   Emergency    Admitting Provider   Bernard Christensen MD    Attending Provider   Bernard Christensen MD    Department, Room/Bed   [facility] 3C MEDICAL INPATIENT, 357/1       Discharge Date       Discharge Disposition       Discharge Destination                               Attending Provider: Bernard Christensen MD    Allergies: Pork-derived Products, Protonix [Pantoprazole]    Isolation: Contact   Infection: None   Code Status: CPR    Ht: 167.6 cm (66\")   Wt: 68.8 kg (151 lb 10.8 oz)    Admission Cmt: None   Principal Problem: Multifocal pneumonia [J18.9]                 Active Insurance as of 4/22/2023     Primary Coverage     Payor Plan Insurance Group Employer/Plan Group    HUMANA MEDICARE REPLACEMENT [facility] NON PAR [id number]     Payor Plan Address Payor Plan Phone Number Payor Plan Fax Number Effective Dates       1/1/2023 - None Entered    Subscriber Name Subscriber Birth Date Member ID       TRACY SOLORZANO 1960 [id number]           Secondary Coverage     Payor Plan Insurance Group Employer/Plan Group    [id number]      Payor Plan Address Payor Plan Phone Number Payor Plan Fax Number Effective Dates    [address]   4/22/2023 - None Entered    [place] [address]       Subscriber Name Subscriber Birth Date Member ID       TRACY SOLORZANO 1960 [id number]                 Emergency Contacts      (Rel.) Home Phone Work Phone Mobile Phone    CARLOS DOMINGUEZ (Sister) [phone] -- [phone]               History & Physical      Miladis Batres APRN at 04/22/23 0832     Attestation signed by Bernard Christensen MD at " 23 1403    Agree with APRN note.  Patient in renal failure we will consult nephrology.  Patient is quite labored likely fluid overloaded 2+ bilateral lower extremity edema.  Will likely need diuresis and careful monitoring of creatinine.  Cardiology consulted as well.                    [facility] Medicine Services  History & Physical    Patient Name: Seth Marie  : 1960  MRN: [id number]  Primary Care Physician:  Rigo De Los Santos MD  Date of admission: 2023  Date and Time of Service: 2023 at 0 830    Subjective       Chief Complaint: Shortness of breath    History of Present Illness: Seth Marie is a 63 y.o. male with chronic O2 use at 3 to 6 L, hypertension, HFrEF, ventricular tachycardia/nonischemic cardiomyopathy with dual-chamber AICD in situ, paroxysmal atrial fibrillation, CKD 4, hyperlipidemia, previous alcohol dependence, depressive disorder, previous fungal meningitis, and dementia who currently resides at [facility] who presented to [facility] on 2023 with complaints of increased shortness of breath overnight.  Patient unable to give any meaningful history, [facility] and [facility] of breath for 30 minutes last night.  Report from emergency room physician assistant states that patient is reportedly at his baseline mental status as per nursing facility, arrived on 8 L nonrebreather but is currently only requiring 4 L.    His chest x-ray demonstrates patchy opacities consistent with pulmonary edema and/or pneumonia, white count normal, lactic acid and blood cultures are pending, he has been given Zithromax and Rocephin in the emergency department.  BNP elevated at 40,715, troponin level elevated at 155, EKG normal sinus rhythm with nonspecific lateral ST abnormality.  His creatinine is 3.5 and BUN 76 which is comparable to previous kidney function obtained 3/12/2023.  AST is elevated at 52, was elevated 1 month  ago at 104.      Review of Systems   Unable to perform ROS: dementia        Personal History     Past Medical History:   Diagnosis Date   • Adjustment disorder    • Alcohol dependence    • Anxiety    • Atrial fibrillation    • CHF (congestive heart failure)    • CKD (chronic kidney disease) stage 4, GFR 15-29 ml/min    • Dementia    • Depression    • Encephalopathy    • GERD (gastroesophageal reflux disease)    • Hepatitis C    • Hyperlipidemia    • Hypertension        History reviewed. No pertinent surgical history.    Family History: family history is not on file. Otherwise pertinent FHx was reviewed and not pertinent to current issue.    Social History:      Home Medications:  Prior to Admission Medications     None            Allergies:  Allergies   Allergen Reactions   • Pork-Derived Products Unknown - Low Severity   • Protonix [Pantoprazole] Unknown - Low Severity       Objective       Vitals:   Temp:  [97.5 °F (36.4 °C)] 97.5 °F (36.4 °C)  Heart Rate:  [82-88] 84  Resp:  [20] 20  BP: (136-147)/(103-112) 136/110  Flow (L/min):  [4-8] 4    Physical Exam  Cardiovascular:      Rate and Rhythm: Normal rate and regular rhythm.      Pulses: Normal pulses.      Heart sounds: No murmur heard.  Pulmonary:      Effort: Pulmonary effort is normal.      Breath sounds: Rales (Bilateral lower lobes) present.      Comments: O2 at 4 liters  Abdominal:      General: Bowel sounds are normal. There is no distension.   Musculoskeletal:      Right lower leg: No edema.      Left lower leg: No edema.   Skin:     General: Skin is warm and dry.   Neurological:      Mental Status: He is alert.   Psychiatric:         Mood and Affect: Mood normal.            Result Review    Result Review:  I have personally reviewed the results from the time of this admission to 4/22/2023 08:33 EDT and agree with these findings:  [x]  Laboratory  [x]  Microbiology  [x]  Radiology chest x-ray independently reviewed and interpreted  [x]  EKG/Telemetry EKG  independently reviewed and interpreted  []  Cardiology/Vascular   []  Pathology  [x]  Old records  []  Other:  Most notable findings include:  (See history of present illness and plan)      Assessment & Plan        Active Hospital Problems:  Active Hospital Problems    Diagnosis    • **Multifocal pneumonia      Plan:   #Shortness of breath  #Pneumonia, community acquired  -Rocephin and Zithromax IV given in emergency department, continue for now  -Follow CBC  -Follow lactic acid and blood cultures  -Check viral panel  -Check urine Legionella and strep pneumococcal    #Acute on chronic systolic heart failure, HFrEF  #CardioMEMs placed 12/2022  #Chronic O2 at 3 to 6 L  -Currently requiring 4 L  -Maintain O2 sat 88 to 92%  -Give Bumex 1 mg IV now    #Elevated troponin, initial level 155  #Elevated LFTs  -Trend troponins, CMP  -Review of left heart cath 12/21/2022 at [facility] demonstrated nonobstructive CAD    #Nonischemic cardiomyopathy  #Ventricular tachycardia, paroxysmal atrial fibrillation  -Dual-chamber AICD in situ   -Review home medications after reconciled, resume suppressant medications    #CKD 4, baseline creatinine 3.5-3.8  -Follow CMP    #HTN  -Resume home medications after reconciled  -Hypertensive on admission, Apresoline as needed    #Hyperlipidemia  -Resume home medications after reconciled    #Dementia  #Previous EtOH dependence  #Depressive disorder  #Previous fungal meningitis  -Anticipate return to nursing home at discharge  -Reported mental status at baseline  -Resume home medications after reconciled      DVT prophylaxis:  No DVT prophylaxis order currently exists.    CODE STATUS:       Admission Status:  I believe this patient meets observation status.    I discussed the patient's findings and my recommendations with patient.    This patient has been examined wearing the appropriate protective equipment  Signature: Electronically signed by IRINEO Longoria, 04/22/23, 08:33 EDT.  Giovanni Camarena  Hospitalist Team    Electronically signed by Bernard Christensen MD at 04/22/23 1403         Current Facility-Administered Medications   Medication Dose Route Frequency Provider Last Rate Last Admin   • acetaminophen (TYLENOL) tablet 650 mg  650 mg Oral Q4H PRN Miladis Batres APRN        Or   • acetaminophen (TYLENOL) 160 MG/5ML solution 650 mg  650 mg Oral Q4H PRN Miladis Batres APRN        Or   • acetaminophen (TYLENOL) suppository 650 mg  650 mg Rectal Q4H PRN Miladis Batres APRN       • albuterol (PROVENTIL) nebulizer solution 0.083% 2.5 mg/3mL  2.5 mg Nebulization Q6H PRN Bernard Christensen MD       • apixaban (ELIQUIS) tablet 5 mg  5 mg Oral BID Miladis Batres APRN   5 mg at 04/24/23 0838   • atorvastatin (LIPITOR) tablet 80 mg  80 mg Oral Q PM Miladis Batres APRN   80 mg at 04/23/23 1547   • buPROPion SR (WELLBUTRIN SR) 12 hr tablet 150 mg  150 mg Oral Daily Miladis Batres APRN   150 mg at 04/24/23 0838   • ferrous sulfate EC tablet 324 mg  324 mg Oral Daily With Breakfast Miladis Batres APRN   324 mg at 04/24/23 0838   • folic acid (FOLVITE) tablet 1 mg  1 mg Oral Daily Miladis aBtres APRN   1 mg at 04/24/23 0838   • hydrALAZINE (APRESOLINE) injection 10 mg  10 mg Intravenous Q6H PRN Miladis Batres APRN       • Magnesium Sulfate - Total Dose 10 grams - Magnesium 1 or Less  2 g Intravenous PRN Bernard Christensen MD        Or   • Magnesium Sulfate - Total Dose 6 grams - Magnesium 1.1 - 1.5  2 g Intravenous PRN Bernard Christensen MD        Or   • Magnesium Sulfate - Total Dose 4 grams - Magnesium 1.6 - 1.9  4 g Intravenous PRN Bernard Christensen MD       • melatonin tablet 5 mg  5 mg Oral Nightly PRN Miladis Batres APRN   5 mg at 04/23/23 2123   • metoprolol succinate XL (TOPROL-XL) 24 hr tablet 12.5 mg  12.5 mg Oral Q24H Miladis Batres APRN   12.5 mg at 04/24/23 0838   • mexiletine (MEXITIL) capsule 150 mg  150 mg Oral TID Miladis Batres APRN   150 mg at 04/24/23 1554   • nitroglycerin (NITROSTAT) SL tablet 0.4 mg  0.4 mg  Sublingual Q5 Min PRN Miladis Batres APRN       • ondansetron (ZOFRAN) tablet 4 mg  4 mg Oral Q6H PRN Miladis Batres APRN        Or   • ondansetron (ZOFRAN) injection 4 mg  4 mg Intravenous Q6H PRN Miladis Batres APRN       • pantoprazole (PROTONIX) EC tablet 40 mg  40 mg Oral Q AM Miladis Batres APRN   40 mg at 04/23/23 0517   • polyethylene glycol (MIRALAX) packet 17 g  17 g Oral Daily Miladis Batres APRN   17 g at 04/24/23 0838   • potassium chloride (K-DUR,KLOR-CON) CR tablet 40 mEq  40 mEq Oral PRN Bernard Christensen MD       • potassium chloride (KLOR-CON) packet 40 mEq  40 mEq Oral PRN Bernard Christensen MD       • sodium bicarbonate tablet 1,300 mg  1,300 mg Oral TID Tho Pires MD   1,300 mg at 04/24/23 1554   • sodium chloride 0.9 % flush 10 mL  10 mL Intravenous PRN Miladis Batres APRN       • sodium chloride 0.9 % flush 10 mL  10 mL Intravenous Q12H Miladis Batres APRN   10 mL at 04/24/23 0839   • sodium chloride 0.9 % flush 10 mL  10 mL Intravenous PRN Miladis Batres APRN       • sodium chloride 0.9 % infusion 40 mL  40 mL Intravenous PRN Miladis Batres APRN       • tamsulosin (FLOMAX) 24 hr capsule 0.8 mg  0.8 mg Oral Nightly Miladis Batres APRN   0.8 mg at 04/23/23 2123   • traMADol (ULTRAM) tablet 50 mg  50 mg Oral Q6H PRN Miladis Batres APRN   50 mg at 04/24/23 0902   • traZODone (DESYREL) tablet 50 mg  50 mg Oral Nightly Miladis Batres APRN   50 mg at 04/23/23 2123        Physician Progress Notes (last 24 hours)      Bernard Christensen MD at 04/24/23 1542        [facility] pending bed availability.     Electronically signed by Bernard Christensen MD at 04/24/23 1543     Ousmane Ho MD at 04/24/23 1022          Referring Provider: Bernard Christensen MD    Reason for follow-up: Heart failure exacerbation, elevated troponin, cardiomyopathy     Patient Care Team:  Rigo De Los Santos MD as PCP - General (Family Medicine)      SUBJECTIVE  He  is not able to have a conversation.  Very somnolent and mumbles.     ROS  Review of all systems negative except as indicated.    Since I have last seen, the patient has been without any chest discomfort, shortness of breath, palpitations, dizziness or syncope.  Denies having any headache, abdominal pain, nausea, vomiting, diarrhea, constipation, loss of weight or loss of appetite.  Denies having any excessive bruising, hematuria or blood in the stool.  ROS      Personal History:    Past Medical History:   Diagnosis Date   • Adjustment disorder    • Alcohol dependence    • Anxiety    • Atrial fibrillation    • CHF (congestive heart failure)    • CKD (chronic kidney disease) stage 4, GFR 15-29 ml/min    • Dementia    • Depression    • Elevated cholesterol    • Encephalopathy    • GERD (gastroesophageal reflux disease)    • Hepatitis C    • Hyperlipidemia    • Hypertension        Past Surgical History:   Procedure Laterality Date   • PACEMAKER REPLACEMENT         History reviewed. No pertinent family history.    Social History     Tobacco Use   • Smoking status: Former     Types: Cigarettes   Vaping Use   • Vaping Use: Never used   Substance Use Topics   • Alcohol use: Not Currently   • Drug use: Not Currently     Types: Heroin        Medications Prior to Admission   Medication Sig Dispense Refill Last Dose   • albuterol sulfate  (90 Base) MCG/ACT inhaler Inhale 2 puffs Every 4 (Four) Hours As Needed for Wheezing.      • apixaban (ELIQUIS) 5 MG tablet tablet Take 1 tablet by mouth 2 (Two) Times a Day.      • atorvastatin (LIPITOR) 80 MG tablet Take 1 tablet by mouth Every Evening.      • buPROPion SR (WELLBUTRIN SR) 150 MG 12 hr tablet Take 1 tablet by mouth Daily.      • carvedilol (COREG) 6.25 MG tablet Take 1 tablet by mouth 2 (Two) Times a Day With Meals.      • Cholecalciferol 25 MCG (1000 UT) tablet Take 2 tablets by mouth Daily.      • ferrous sulfate 325 (65 FE) MG tablet Take 1 tablet by mouth Daily  With Breakfast.      • finasteride (PROSCAR) 5 MG tablet Take 1 tablet by mouth Daily.      • folic acid (FOLVITE) 1 MG tablet Take 1 tablet by mouth Daily.      • furosemide (LASIX) 40 MG tablet Take 1 tablet by mouth Daily.      • hydrALAZINE (APRESOLINE) 10 MG tablet Take 1 tablet by mouth 3 (Three) Times a Day.      • ipratropium-albuterol (DUO-NEB) 0.5-2.5 mg/3 ml nebulizer Take 3 mL by nebulization Every 4 (Four) Hours As Needed for Wheezing.      • isosorbide dinitrate (ISORDIL) 10 MG tablet Take 1 tablet by mouth 3 (Three) Times a Day.      • Lidocaine 4 % patch Apply 2 patches topically Every Morning.      • Menthol-Zinc Oxide (CALMOSEPTINE EX) Apply 1 application topically 2 (Two) Times a Day.      • mexiletine (MEXITIL) 150 MG capsule Take 1 capsule by mouth 3 (Three) Times a Day.      • naloxone (NARCAN) 4 MG/0.1ML nasal spray 1 spray into the nostril(s) as directed by provider As Needed.      • nitroglycerin (NITROLINGUAL) 0.4 MG/SPRAY spray Place 1 spray under the tongue Every 5 (Five) Minutes As Needed for Chest Pain.      • omeprazole (priLOSEC) 20 MG capsule Take 1 capsule by mouth Daily.      • polyethylene glycol (MIRALAX) 17 g packet Take 17 g by mouth Daily.      • sodium bicarbonate 650 MG tablet Take 2 tablets by mouth 2 (Two) Times a Day.      • tamsulosin (FLOMAX) 0.4 MG capsule 24 hr capsule Take 2 capsules by mouth Every Night.      • traZODone (DESYREL) 50 MG tablet Take 1 tablet by mouth Every Night.          Allergies:  Pork-derived products and Protonix [pantoprazole]    Scheduled Meds:albuterol, 2.5 mg, Nebulization, Q6H - RT  apixaban, 5 mg, Oral, BID  atorvastatin, 80 mg, Oral, Q PM  buPROPion SR, 150 mg, Oral, Daily  ferrous sulfate, 324 mg, Oral, Daily With Breakfast  folic acid, 1 mg, Oral, Daily  metoprolol succinate XL, 12.5 mg, Oral, Q24H  mexiletine, 150 mg, Oral, TID  pantoprazole, 40 mg, Oral, Q AM  polyethylene glycol, 17 g, Oral, Daily  sodium bicarbonate, 1,300 mg, Oral,  "TID  sodium chloride, 10 mL, Intravenous, Q12H  tamsulosin, 0.8 mg, Oral, Nightly  traZODone, 50 mg, Oral, Nightly      Continuous Infusions:   PRN Meds:.•  acetaminophen **OR** acetaminophen **OR** acetaminophen  •  hydrALAZINE  •  magnesium sulfate **OR** magnesium sulfate **OR** magnesium sulfate  •  melatonin  •  nitroglycerin  •  ondansetron **OR** ondansetron  •  potassium chloride  •  potassium chloride  •  sodium chloride  •  sodium chloride  •  sodium chloride  •  traMADol      OBJECTIVE    Vital Signs  Vitals:    04/24/23 0600 04/24/23 0734 04/24/23 1117 04/24/23 1128   BP:  121/94 106/85    BP Location:  Right arm Left arm    Patient Position:  Lying Lying    Pulse:  61 66 60   Resp:  20 16    Temp:  97.6 °F (36.4 °C) 97.3 °F (36.3 °C)    TempSrc:  Axillary Axillary    SpO2:  98% 100% 96%   Weight: 68.8 kg (151 lb 10.8 oz)      Height:           Flowsheet Rows    Flowsheet Row First Filed Value   Admission Height 167.6 cm (66\") Documented at 04/22/2023 0555   Admission Weight 72.6 kg (160 lb) Documented at 04/22/2023 0555            Intake/Output Summary (Last 24 hours) at 4/24/2023 1248  Last data filed at 4/24/2023 0952  Gross per 24 hour   Intake 220 ml   Output 400 ml   Net -180 ml          Telemetry: Sinus rhythm    Physical Exam:  The patient is very somnolent and disoriented  Vital signs as noted above.  Head and neck revealed no carotid bruits or jugular venous distention.  No thyromegaly or lymphadenopathy is present  Lungs clear.  No wheezing.  Breath sounds are normal bilaterally.  Heart normal first and second heart sounds.  No murmur. No precordial rub is present.  No gallop is present.  Abdomen soft and nontender.  No organomegaly is present.  Extremities with good peripheral pulses without any pedal edema.  Skin warm and dry.  Musculoskeletal system is grossly normal.  CNS grossly normal.       Results Review:  I have personally reviewed the results from the time of this admission to " 4/24/2023 12:48 EDT and agree with these findings:  []  Laboratory  []  Microbiology  []  Radiology  []  EKG/Telemetry   []  Cardiology/Vascular   []  Pathology  []  Old records  []  Other:    Most notable findings include:    Lab Results (last 24 hours)     Procedure Component Value Units Date/Time    Blood Culture - Blood, Arm, Left [[id number]]  (Normal) Collected: 04/22/23 0825    Specimen: Blood from Arm, Left Updated: 04/24/23 0830     Blood Culture No growth at 2 days    Blood Culture - Blood, Arm, Left [[id number]]  (Normal) Collected: 04/22/23 0825    Specimen: Blood from Arm, Left Updated: 04/24/23 0830     Blood Culture No growth at 2 days    Comprehensive Metabolic Panel [[id number]]  (Abnormal) Collected: 04/24/23 0245    Specimen: Blood Updated: 04/24/23 0401     Glucose 81 mg/dL      BUN 80 mg/dL      Creatinine 3.78 mg/dL      Sodium 141 mmol/L      Potassium 5.0 mmol/L      Comment: Slight hemolysis detected by analyzer. Results may be affected.        Chloride 107 mmol/L      CO2 16.0 mmol/L      Calcium 9.7 mg/dL      Total Protein 6.3 g/dL      Albumin 2.8 g/dL      ALT (SGPT) 25 U/L      AST (SGOT) 41 U/L      Comment: Slight hemolysis detected by analyzer. Results may be affected.        Alkaline Phosphatase 199 U/L      Total Bilirubin 1.1 mg/dL      Globulin 3.5 gm/dL      A/G Ratio 0.8 g/dL      BUN/Creatinine Ratio 21.2     Anion Gap 18.0 mmol/L      eGFR 16.9 mL/min/1.73     Narrative:      GFR Normal >60  Chronic Kidney Disease <60  Kidney Failure <15      High Sensitivity Troponin T 2Hr [[id number]]  (Abnormal) Collected: 04/24/23 0245    Specimen: Blood Updated: 04/24/23 0401     HS Troponin T 120 ng/L      Troponin T Delta -10 ng/L     Narrative:      High Sensitive Troponin T Reference Range:  <10.0 ng/L- Negative Female for AMI  <15.0 ng/L- Negative Male for AMI  >=10 - Abnormal Female indicating possible myocardial injury.  >=15 - Abnormal Male indicating possible myocardial injury.    Clinicians would have to utilize clinical acumen, EKG, Troponin, and serial changes to determine if it is an Acute Myocardial Infarction or myocardial injury due to an underlying chronic condition.         CBC Auto Differential [[id number]]  (Abnormal) Collected: 04/24/23 0302    Specimen: Blood Updated: 04/24/23 0329     WBC 3.80 10*3/mm3      RBC 4.46 10*6/mm3      Hemoglobin 12.7 g/dL      Hematocrit 40.9 %      MCV 91.9 fL      MCH 28.4 pg      MCHC 30.9 g/dL      RDW 19.3 %      RDW-SD 66.1 fl      MPV 9.5 fL      Platelets 130 10*3/mm3      Neutrophil % 75.6 %      Lymphocyte % 18.6 %      Monocyte % 4.2 %      Eosinophil % 1.2 %      Basophil % 0.4 %      Neutrophils, Absolute 2.90 10*3/mm3      Lymphocytes, Absolute 0.70 10*3/mm3      Monocytes, Absolute 0.20 10*3/mm3      Eosinophils, Absolute 0.00 10*3/mm3      Basophils, Absolute 0.00 10*3/mm3      nRBC 1.4 /100 WBC           Imaging Results (Last 24 Hours)     Procedure Component Value Units Date/Time    XR Chest 1 View [[id number]] Collected: 04/24/23 0736     Updated: 04/24/23 0740    Narrative:      XR CHEST 1 VW    Date of Exam: 4/24/2023 4:00 AM EDT    Indication: pulmonary edema  post op    Comparison: 4/23/2023    Findings:  Left subclavian transvenous pacemaker remains in place unchanged in position. Cardiomegaly is stable. There is been no change in bilateral airspace disease. No definite pleural effusion. No evidence of pneumothorax.     Impression:      Impression:    1. Stable cardiomegaly.  2. No change in bilateral airspace disease.      Electronically Signed: Carlitos Lovell    4/24/2023 7:37 AM EDT    Workstation ID: [id number]          LAB RESULTS (LAST 7 DAYS)    CBC  Results from last 7 days   Lab Units 04/24/23  0302 04/23/23  0917 04/22/23  0618   WBC 10*3/mm3 3.80 4.00 5.00   RBC 10*6/mm3 4.46 4.63 4.94   HEMOGLOBIN g/dL 12.7* 13.4 14.5   HEMATOCRIT % 40.9 43.7 45.4   MCV fL 91.9 94.4 91.7   PLATELETS 10*3/mm3 130* 160 169        BMP  Results from last 7 days   Lab Units 04/24/23  0245 04/23/23  0917 04/22/23  0618   SODIUM mmol/L 141 147* 146*   POTASSIUM mmol/L 5.0 4.3 4.5   CHLORIDE mmol/L 107 110* 106   CO2 mmol/L 16.0* 21.0* 22.0   BUN mg/dL 80* 78* 76*   CREATININE mg/dL 3.78* 3.53* 3.54*   GLUCOSE mg/dL 81 105* 107*   MAGNESIUM mg/dL  --  2.3 2.2   PHOSPHORUS mg/dL  --  4.1 3.1       CMP   Results from last 7 days   Lab Units 04/24/23  0245 04/23/23  0917 04/22/23  0618   SODIUM mmol/L 141 147* 146*   POTASSIUM mmol/L 5.0 4.3 4.5   CHLORIDE mmol/L 107 110* 106   CO2 mmol/L 16.0* 21.0* 22.0   BUN mg/dL 80* 78* 76*   CREATININE mg/dL 3.78* 3.53* 3.54*   GLUCOSE mg/dL 81 105* 107*   ALBUMIN g/dL 2.8* 3.0* 3.5   BILIRUBIN mg/dL 1.1 1.2 1.9*   ALK PHOS U/L 199* 218* 265*   AST (SGOT) U/L 41* 43* 52*   ALT (SGPT) U/L 25 27 33       BNP        TROPONIN  Results from last 7 days   Lab Units 04/24/23  0245   HSTROP T ng/L 120*       CoAg        Creatinine Clearance  Estimated Creatinine Clearance: 19 mL/min (A) (by C-G formula based on SCr of 3.78 mg/dL (H)).    ABG        Radiology  XR Chest 1 View    Result Date: 4/24/2023  Impression: 1. Stable cardiomegaly. 2. No change in bilateral airspace disease. Electronically Signed: Carlitos Lovell  4/24/2023 7:37 AM EDT  Workstation ID: [id number]    XR Chest 1 View    Result Date: 4/23/2023  Impression: Cardiomegaly with bilateral airspace disease which may represent edema or multifocal infiltrates. Findings are similar or slightly improved since 4/22/2023. Electronically Signed: Mohinder Pérez  4/23/2023 8:47 AM EDT  Workstation ID: [id number]    US Renal Bilateral    Result Date: 4/22/2023  Impression: 1.Increased renal cortical echogenicity, which can be seen with chronic medical renal disease. 2.No hydronephrosis. 3.Incidental ascites. Electronically Signed: Emily Webster  4/22/2023 3:45 PM EDT  Workstation ID: [id number]        EKG  I personally viewed and interpreted the patient's  EKG/Telemetry data:  ECG 12 Lead Dyspnea   Final Result   HEART RATE= 88  bpm   RR Interval= 680  ms   MO Interval= 167  ms   P Horizontal Axis= 2  deg   P Front Axis= 49  deg   QRSD Interval= 110  ms   QT Interval= 433  ms   QRS Axis= -53  deg   T Wave Axis= 133  deg   - ABNORMAL ECG -   Sinus rhythm   LAD, consider left anterior fascicular block   Low voltage, extremity leads   Anteroseptal infarct, old   Nonspecific T abnormalities, lateral leads   Prolonged QT interval   No previous ECG available for comparison   Electronically Signed By: Tony Deleon (Dennis) 22-Apr-2023 10:27:58   Date and Time of Study: 2023-04-22 06:25:45      SCANNED - TELEMETRY     Final Result      SCANNED - TELEMETRY     Final Result      SCANNED - TELEMETRY     Final Result            Echocardiogram:    Results for orders placed during the hospital encounter of 04/22/23    Adult Transthoracic Echo Complete W/ Cont if Necessary Per Protocol    Interpretation Summary  •  Left ventricular systolic function is severely decreased. Calculated left ventricular EF = 15% Left ventricular ejection fraction appears to be less than 20%.  •  The left ventricular cavity is moderate to severely dilated.  •  Left ventricular diastolic dysfunction is noted.  •  Severely reduced right ventricular systolic function noted.  •  The right ventricular cavity is dilated.  •  The right atrial cavity is severely  dilated.  •  Severe mitral valve regurgitation is present.  •  Severe tricuspid valve regurgitation is present.  •  Estimated right ventricular systolic pressure from tricuspid regurgitation is markedly elevated (>55 mmHg).  •  Severe pulmonary hypertension is present.        Stress Test:         Cardiac Catheterization:  No results found for this or any previous visit.         Other:         ASSESSMENT & PLAN:    Principal Problem:    Multifocal pneumonia    CHF exacerbation/HFrEF  Previous echocardiogram showed EF of 32% with end-diastolic dimension of 5  cm, severe pulmonary hypertension, severe TR RVSP 51 mmHg.  Previous cardiac catheterization in December 2022 showed nonobstructive coronary disease with slow flow phenomena.  Cardiac MRI with no evidence of amyloid  Restart Toprol-XL 25 mg p.o. daily  Unable to start ACE inhibitor, ARNI or SGLT2 due to renal dysfunction.  GFR is less than 20  Previous attempts were made for afterload reduction with hydralazine and Isordil.  Blood pressure has improved.  Start low-dose hydralazine for afterload reduction  Repeat echo shows EF of 20%, severe biventricular failure with severe mitral and tricuspid regurgitation with severe pulmonary hypertension  1 dose of IV Lasix  Can offer a right heart cath for hemodynamic monitoring  Previous right heart cath showed cardiac index of 1.6  He has CardioMEMS in place  He also has ICD  Palliative and hospice consult due to poor long-term prognosis.     Pneumonia  Completed IV antibiotics for pneumonia  Oxygen supplementation as needed     Atrial fibrillation  Continue full dose anticoagulation with Eliquis 5 mg twice daily  Rate control with metoprolol XL     Hyperlipidemia  High intensity statin     Chronic kidney disease  Poor renal function with GFR of less than 20.  Avoid nephrotoxins.  Likely due to cardiorenal.  Creatinine is 3.5, BUN is 78  He may end up on hemodialysis  Discussed goals of care     Neuropathy  Continue gabapentin  Previous fungal meningitis and alcohol dependence      Overall prognosis for this patient is very poor    Ousmane Ho MD  04/24/23  12:48 EDT                  Electronically signed by Ousmane Ho MD at 04/24/23 6747     Tho Pires MD at 04/24/23 0820          RENAL/KCC:     LOS: 1 day    Patient Care Team:  Rigo De Los Santos MD as PCP - General (Family Medicine)    Chief Complaint:  BRET/CKD    Subjective     Interval History:   Chart reviewed, discussed with nurse  Unable to provide reliable ROS  Not voiding on his  "own    Objective     Vital Sign Min/Max for last 24 hours  Temp  Min: 97.6 °F (36.4 °C)  Max: 97.8 °F (36.6 °C)   BP  Min: 103/84  Max: 132/93   Pulse  Min: 61  Max: 83   Resp  Min: 20  Max: 28   SpO2  Min: 84 %  Max: 99 %   Flow (L/min)  Min: 4  Max: 4   Weight  Min: 68.8 kg (151 lb 10.8 oz)  Max: 68.8 kg (151 lb 10.8 oz)     Flowsheet Rows    Flowsheet Row First Filed Value   Admission Height 167.6 cm (66\") Documented at 04/22/2023 0555   Admission Weight 72.6 kg (160 lb) Documented at 04/22/2023 0555          No intake/output data recorded.  I/O last 3 completed shifts:  In: 350 [P.O.:350]  Out: -     Physical Exam:  GEN: Awake, NAD  ENT: PERRL, EOMI, MMM  NECK: Supple, no JVD  CHEST: CTAB, no W/R/C  CV: RRR, no M/G/R  ABD: Soft, NT, +BS  SKIN: Warm and Dry  NEURO: CN's intact      WBC WBC   Date Value Ref Range Status   04/24/2023 3.80 3.40 - 10.80 10*3/mm3 Final   04/23/2023 4.00 3.40 - 10.80 10*3/mm3 Final   04/22/2023 5.00 3.40 - 10.80 10*3/mm3 Final      HGB Hemoglobin   Date Value Ref Range Status   04/24/2023 12.7 (L) 13.0 - 17.7 g/dL Final   04/23/2023 13.4 13.0 - 17.7 g/dL Final   04/22/2023 14.5 13.0 - 17.7 g/dL Final      HCT Hematocrit   Date Value Ref Range Status   04/24/2023 40.9 37.5 - 51.0 % Final   04/23/2023 43.7 37.5 - 51.0 % Final   04/22/2023 45.4 37.5 - 51.0 % Final      Platlets No results found for: LABPLAT   MCV MCV   Date Value Ref Range Status   04/24/2023 91.9 79.0 - 97.0 fL Final   04/23/2023 94.4 79.0 - 97.0 fL Final   04/22/2023 91.7 79.0 - 97.0 fL Final          Sodium Sodium   Date Value Ref Range Status   04/24/2023 141 136 - 145 mmol/L Final   04/23/2023 147 (H) 136 - 145 mmol/L Final   04/22/2023 146 (H) 136 - 145 mmol/L Final      Potassium Potassium   Date Value Ref Range Status   04/24/2023 5.0 3.5 - 5.2 mmol/L Final     Comment:     Slight hemolysis detected by analyzer. Results may be affected.   04/23/2023 4.3 3.5 - 5.2 mmol/L Final   04/22/2023 4.5 3.5 - 5.2 mmol/L " Final     Comment:     Slight hemolysis detected by analyzer. Results may be affected.      Chloride Chloride   Date Value Ref Range Status   04/24/2023 107 98 - 107 mmol/L Final   04/23/2023 110 (H) 98 - 107 mmol/L Final   04/22/2023 106 98 - 107 mmol/L Final      CO2 CO2   Date Value Ref Range Status   04/24/2023 16.0 (L) 22.0 - 29.0 mmol/L Final   04/23/2023 21.0 (L) 22.0 - 29.0 mmol/L Final   04/22/2023 22.0 22.0 - 29.0 mmol/L Final      BUN BUN   Date Value Ref Range Status   04/24/2023 80 (H) 8 - 23 mg/dL Final   04/23/2023 78 (H) 8 - 23 mg/dL Final   04/22/2023 76 (H) 8 - 23 mg/dL Final      Creatinine Creatinine   Date Value Ref Range Status   04/24/2023 3.78 (H) 0.76 - 1.27 mg/dL Final   04/23/2023 3.53 (H) 0.76 - 1.27 mg/dL Final   04/22/2023 3.54 (H) 0.76 - 1.27 mg/dL Final      Calcium Calcium   Date Value Ref Range Status   04/24/2023 9.7 8.6 - 10.5 mg/dL Final   04/23/2023 10.2 8.6 - 10.5 mg/dL Final   04/22/2023 10.4 8.6 - 10.5 mg/dL Final      PO4 No results found for: CAPO4   Albumin Albumin   Date Value Ref Range Status   04/24/2023 2.8 (L) 3.5 - 5.2 g/dL Final   04/23/2023 3.0 (L) 3.5 - 5.2 g/dL Final   04/22/2023 3.5 3.5 - 5.2 g/dL Final      Magnesium Magnesium   Date Value Ref Range Status   04/23/2023 2.3 1.6 - 2.4 mg/dL Final   04/22/2023 2.2 1.6 - 2.4 mg/dL Final      Uric Acid No results found for: URICACID        Results Review:     I reviewed the patient's new clinical results.    albuterol, 2.5 mg, Nebulization, Q6H - RT  apixaban, 5 mg, Oral, BID  atorvastatin, 80 mg, Oral, Q PM  buPROPion SR, 150 mg, Oral, Daily  cefTRIAXone, 1 g, Intravenous, Daily  ferrous sulfate, 324 mg, Oral, Daily With Breakfast  folic acid, 1 mg, Oral, Daily  metoprolol succinate XL, 12.5 mg, Oral, Q24H  mexiletine, 150 mg, Oral, TID  pantoprazole, 40 mg, Oral, Q AM  polyethylene glycol, 17 g, Oral, Daily  sodium bicarbonate, 1,300 mg, Oral, TID  sodium chloride, 10 mL, Intravenous, Q12H  tamsulosin, 0.8 mg,  Oral, Nightly  traZODone, 50 mg, Oral, Nightly           Medication Review: Reviewed    Assessment & Plan     1) BRET vs CKD progression  2) CKD4 - Cr 2.8 in January - now in the mid 3's  3) Urinary retention  4) Dysuria  5) CHF  6) PNA  7) HTN  8) Metabolic acidosis    Plan: Difficult situation.  Cr 3.7 this AM.  No emergent need for HD but will discuss goals of care with family.  Consult  for bladder retention and straight cath prn as able.  ABX per primary.  Diuretics prn.  Volume stable today.  Avoid nephrotoxins.  Add scheduled bicarb.  Will follow.      Tho Pires MD   [facility]  23  08:20 EDT    Electronically signed by Tho Pires MD at 23 0823     Miladis Batres APRN at 23 0807              [facility]   Daily Progress Note    Patient Name: Seth Marie  : 1960  MRN: [id number]  Primary Care Physician:  Rigo De Los Santos MD  Date of admission: 2023  Date and Time of Service: 2023 at 0 930      Subjective       Chief Complaint: Shortness of breath      Brief Patient Summary:  Seth Marie is a 63 y.o. male admitted from SNF with increased oxygen requirements, antibiotics initiated for potential pneumonia on admission, all markers negative for sepsis and atypical infection, white count normal, chest x-ray infiltrates and edema slightly improved on chest x-ray today, maintaining oxygenation on chronic O2 level, Zithromax and Rocephin discontinued.  Cardiology following for CHF exacerbation, echo with severe mitral and tricuspid regurgitation.  Nephrology following for BRET on CKD IV, diuresis on hold for now with stable oxygen requirements at his baseline, urology placed John catheter for retention and increased Flomax.  Patient continues to be confused, increased agitation/delirium overnight, removing oxygen, IVs, etc, will check ammonia level. Sister attempting to obtain POA as patient has  "dementia, she is currently en route from [place], and would prefer he be transferred to [facility].  Case management aware and assisting [facility] once accepting [facility] obtained.  Palliative care consult while in our hospital to assist with goals.    Patient Reports answers unintelligible    Subjective:  Symptoms:  No shortness of breath or chest pain.    Diet:  No nausea.    Pain:  He reports no pain.          Objective       Vitals:   Temp:  [97.6 °F (36.4 °C)-97.8 °F (36.6 °C)] 97.6 °F (36.4 °C)  Heart Rate:  [61-83] 61  Resp:  [20-28] 20  BP: (103-132)/(67-98) 121/94  Flow (L/min):  [4] 4  Objective:  General Appearance:  Comfortable.    Vital signs: (most recent): Blood pressure 106/85, pulse 60, temperature 97.3 °F (36.3 °C), temperature source Axillary, resp. rate 16, height 167.6 cm (66\"), weight 68.8 kg (151 lb 10.8 oz), SpO2 96 %.    Lungs:  Normal effort and normal respiratory rate.  There are rales (bilateral bases).  (O2 at 4 liters)  Heart: Normal rate.  Regular rhythm.  S1 normal and S2 normal.  Positive for murmur.    Extremities: There is no dependent edema.    Neurological: Patient is alert.    Skin:  Warm and dry.                    Result Review    Result Review:  I have personally reviewed the results from the time of this admission to 4/24/2023 08:07 EDT and agree with these findings:  [x]  Laboratory  []  Microbiology  [x]  Radiology  []  EKG/Telemetry   []  Cardiology/Vascular   []  Pathology  []  Old records  []  Other:  Most notable findings include: See summary and plan          Assessment & Plan        albuterol, 2.5 mg, Nebulization, Q6H - RT  apixaban, 5 mg, Oral, BID  atorvastatin, 80 mg, Oral, Q PM  buPROPion SR, 150 mg, Oral, Daily  cefTRIAXone, 1 g, Intravenous, Daily  ferrous sulfate, 324 mg, Oral, Daily With Breakfast  folic acid, 1 mg, Oral, Daily  metoprolol succinate XL, 12.5 mg, Oral, Q24H  mexiletine, 150 mg, Oral, TID  pantoprazole, 40 mg, Oral, Q " AM  polyethylene glycol, 17 g, Oral, Daily  sodium bicarbonate, 1,300 mg, Oral, TID  sodium chloride, 10 mL, Intravenous, Q12H  tamsulosin, 0.8 mg, Oral, Nightly  traZODone, 50 mg, Oral, Nightly             Active Hospital Problems:  Active Hospital Problems    Diagnosis    • **Multifocal pneumonia      Plan:   #Shortness of breath  #Pneumonia, community acquired  -Follow CBC----white count normal  -Lactic acid normal, viral panel negative  -Urine Legionella and strep pneumococcal negative, Zithromax discontinued yesterday  -Improved infiltrates, DC Rocephin today     #Acute on chronic systolic heart failure, HFrEF, EF 20%  #CardioMEMs placed 12/2022  #Chronic O2 at 3 to 6 L  -Currently requiring 4 L  -Maintain O2 sat 88 to 92%  -Cardiology following  -Echocardiogram with severe MR and TR     #Elevated troponin, initial level 155  #Elevated LFTs  -Troponins trending down  -LFTs trending down  -Review of left heart cath 12/21/2022 at [facility] demonstrated nonobstructive CAD     #Nonischemic cardiomyopathy, EF 20%  #Ventricular tachycardia, paroxysmal atrial fibrillation  -Dual-chamber AICD in situ   -Resume home meds     #BRET on CKD 4, baseline creatinine 3.5-3.8  -Follow CMP----creatinine stable at 3.5-3.7  -Nephrology following     #HTN  -Toprol XL 12.5 mg daily for now  -Periods of hypotension noted, continue holding home antihypertensives  -Hypertensive on admission, Apresoline as needed     #Hyperlipidemia  -Statin     #Delirium  #Dementia  #Previous EtOH dependence  #Depressive disorder  #Previous fungal meningitis  -Anticipate return to nursing home at discharge  -Reported mental status at baseline  -Resume home medications    -Check ammonia level     DVT prophylaxis:  Medical and mechanical DVT prophylaxis orders are present.    CODE STATUS:    Code Status (Patient has no pulse and is not breathing): CPR (Attempt to Resuscitate)  Medical Interventions (Patient has pulse or is breathing): Full Support  Release  to patient: Routine Release      Disposition:  I expect patient to be discharged 4 to 5 days.    Plan of care discussed with bedside nurse, attending MD    This patient has been examined wearing appropriate Personal Protective Equipment   Electronically signed by IRINEO Longoria, 04/24/23, 08:07 EDT.  [facility] Hospitalist Team             Electronically signed by Miladis Batres APRN at 04/24/23 1516          Consult Notes (last 24 hours)      Tho Alva MD at 04/24/23 1145      Consult Orders    1. Inpatient Urology Consult [[id number]] ordered by Tho Pires MD at 04/24/23 4282             Consult for Retention    Unhealthy man  Can get no history from him   Getting straight cath'd  Nephrology following - considering dialysis  On flomax  Apparently family is trying to move [facility] to [facility] to them in [place]    I placed a 16 Maori coude into the bladder without problems with clear return of urine    Penis - circ'd; testes normal    Impression:  1. Renal Failure  2. Retention    Plan:  1. Recommend continuing flomax 0.8 mg  2. Leave cheng for now  3. Will await disposition.  Would like to know if he has had PSA's as an outpatient.  Would need cysto at some point, whether with us or at the [facility]    -will follow    Electronically signed by Tho Alva MD at 04/24/23 2268

## 2023-04-24 NOTE — PLAN OF CARE
Goal outcome: Declined    Patient is A&Ox1, confused. Unable to reorient to reality. Requiring med sitter at beside r/t continuously pulling off tele leads, attempting to self remove IV, taking off nasal cannula, and attempting to transfer without assist. Patient high risk for falls. Patient has not urinated at this time this shift. Multiple bladder scans performed noting <500ml urine. Patient denies symptoms of bladder pain/discomfort. Patient has been unable to urinate in urinal offered by this nurse. Patient has consumed 480ml of fluids and his most recent bladder scan noted 435ml of urine. Patient also has triggered + for sepsis. On call provider Dr. Guevara notified, NNO received. Patient remains resting quietly in bed. Bed locked, low, clwr, fall precautions in place.

## 2023-04-24 NOTE — PROGRESS NOTES
"Heart Failure Program  Nurse Navigator  Discharge Planning    Patient Name:Seth Marie  :1960  Cardiologist:Vic  Current Admission Date: 2023   Previous Admission:    Admission frequency: 1 admissions in 6 months    Heart Failure history per record:    Symptoms on admission:c/o Sent from SNF for SOA. Pt with hx of HFrEF per record. Unable to discuss further with pt due to current orientiion, per not pt has a sister coming from [place].    Admission Weight:  Flowsheet Rows    Flowsheet Row First Filed Value   Admission Height 167.6 cm (66\") Documented at 2023 0555   Admission Weight 72.6 kg (160 lb) Documented at 2023 0555            Current Home Medications:  Prior to Admission medications    Medication Sig Start Date End Date Taking? Authorizing Provider   albuterol sulfate  (90 Base) MCG/ACT inhaler Inhale 2 puffs Every 4 (Four) Hours As Needed for Wheezing.   Yes Yuly Paul MD   apixaban (ELIQUIS) 5 MG tablet tablet Take 1 tablet by mouth 2 (Two) Times a Day.   Yes Yuly Paul MD   atorvastatin (LIPITOR) 80 MG tablet Take 1 tablet by mouth Every Evening.   Yes Yuly Paul MD   buPROPion SR (WELLBUTRIN SR) 150 MG 12 hr tablet Take 1 tablet by mouth Daily.   Yes Yuly Paul MD   carvedilol (COREG) 6.25 MG tablet Take 1 tablet by mouth 2 (Two) Times a Day With Meals.   Yes Yuly Paul MD   Cholecalciferol 25 MCG (1000 UT) tablet Take 2 tablets by mouth Daily.   Yes Yuly Paul MD   ferrous sulfate 325 (65 FE) MG tablet Take 1 tablet by mouth Daily With Breakfast.   Yes Yuly Paul MD   finasteride (PROSCAR) 5 MG tablet Take 1 tablet by mouth Daily.   Yes Yuly Paul MD   folic acid (FOLVITE) 1 MG tablet Take 1 tablet by mouth Daily.   Yes Yuly Paul MD   furosemide (LASIX) 40 MG tablet Take 1 tablet by mouth Daily.   Yes Yuly Paul MD   hydrALAZINE (APRESOLINE) 10 MG tablet " Take 1 tablet by mouth 3 (Three) Times a Day.   Yes Yuly Paul MD   ipratropium-albuterol (DUO-NEB) 0.5-2.5 mg/3 ml nebulizer Take 3 mL by nebulization Every 4 (Four) Hours As Needed for Wheezing. 4/21/23 4/26/26 Yes Yuly Paul MD   isosorbide dinitrate (ISORDIL) 10 MG tablet Take 1 tablet by mouth 3 (Three) Times a Day.   Yes Yuly Paul MD   Lidocaine 4 % patch Apply 2 patches topically Every Morning.   Yes Yuly Paul MD   Menthol-Zinc Oxide (CALMOSEPTINE EX) Apply 1 application topically 2 (Two) Times a Day.   Yes Yuly Paul MD   mexiletine (MEXITIL) 150 MG capsule Take 1 capsule by mouth 3 (Three) Times a Day.   Yes Yuly Paul MD   naloxone (NARCAN) 4 MG/0.1ML nasal spray 1 spray into the nostril(s) as directed by provider As Needed.   Yes Yuly Paul MD   nitroglycerin (NITROLINGUAL) 0.4 MG/SPRAY spray Place 1 spray under the tongue Every 5 (Five) Minutes As Needed for Chest Pain.   Yes Yuly Paul MD   omeprazole (priLOSEC) 20 MG capsule Take 1 capsule by mouth Daily.   Yes Yuly Paul MD   polyethylene glycol (MIRALAX) 17 g packet Take 17 g by mouth Daily.   Yes Yuly Paul MD   sodium bicarbonate 650 MG tablet Take 2 tablets by mouth 2 (Two) Times a Day.   Yes Yuly Paul MD   tamsulosin (FLOMAX) 0.4 MG capsule 24 hr capsule Take 2 capsules by mouth Every Night.   Yes Yuly Paul MD   traZODone (DESYREL) 50 MG tablet Take 1 tablet by mouth Every Night.   Yes Yuly Paul MD       Social history:   Pt from [facility], unsure how pt got to SNF, no record of pt being seen here before.     Smoking status:     Diagnostics Testing:  proBNP level: 19613    Echocardiogram:Results for orders placed during the hospital encounter of 04/22/23    Adult Transthoracic Echo Complete W/ Cont if Necessary Per Protocol    Interpretation Summary  •  Left ventricular systolic function is  "severely decreased. Calculated left ventricular EF = 15% Left ventricular ejection fraction appears to be less than 20%.  •  The left ventricular cavity is moderate to severely dilated.  •  Left ventricular diastolic dysfunction is noted.  •  Severely reduced right ventricular systolic function noted.  •  The right ventricular cavity is dilated.  •  The right atrial cavity is severely  dilated.  •  Severe mitral valve regurgitation is present.  •  Severe tricuspid valve regurgitation is present.  •  Estimated right ventricular systolic pressure from tricuspid regurgitation is markedly elevated (>55 mmHg).  •  Severe pulmonary hypertension is present.        Patient Assessment:   Pt awake and nonoriented, unsure of baseline. No pedal edema. Resp even and unlabored, pt  On O2 at this time.    Current O2: 4L NC  Home O2: unknown    Education provided to patient:  pending- Heart Failure disease education  pending -Symptom identification/management  pending -Daily Weights  pending- Diet education  pending- Fluid restriction (if ordered)  pending- Activity education  pending- Medication education  pending- Smoking cessation  pending- Follow-up Appointments  pending-Provided information on how to access [facility] My HF Guide/Heart Failure Interactive workbook    Acceptance of learning: unable to provide education at this time    Heart Failure education interactive teaching session time:  10 minutes    GWTG: EF 15% - toprol XL, \"no ACE/ARB or SGLT2 d/t renal\" per cardiology    Identified needs/barriers:   Pending palliative consult, awaiting next of kin, GDMT - addressed, I&O, daily weights    Intervention:   RN          "

## 2023-04-24 NOTE — NURSING NOTE
Patient has not urinated this shift. x3 bladder scans have been performed. <500ml urine observed. Patient denies bladder tenderness, pain. No reaction objectively observed upon palpation. Urinal offered and applied. Patient has not urinated.

## 2023-04-24 NOTE — CASE MANAGEMENT/SOCIAL WORK
Discharge Planning Assessment  [facility]     Patient Name: Seth Marie  MRN: [id number]  Today's Date: 4/24/2023    Admit Date: 4/22/2023    Plan: Return to [place], [facility] good through 4/[facility].   Discharge Needs Assessment     Row Name 04/24/23 1409       Living Environment    People in Home facility resident  [facility]    Current Living Arrangements extended care facility    Potentially Unsafe Housing Conditions none    Primary Care Provided by self    Provides Primary Care For no one, unable/limited ability to care for self    Family Caregiver if Needed sibling(s)    Family Caregiver Names Alina-sister    Quality of Family Relationships helpful;involved;supportive    Able to Return to Prior Arrangements yes       Resource/Environmental Concerns    Resource/Environmental Concerns none    Transportation Concerns no car       Transition Planning    Patient/Family Anticipated Services at Transition skilled nursing    Transportation Anticipated health plan transportation       Discharge Needs Assessment    Readmission Within the Last 30 Days no previous admission in last 30 days    Current Outpatient/Agency/Support Group skilled nursing facility  [facility]    Equipment Currently Used at Home wheelchair;walker, rolling    Concerns to be Addressed care coordination/care conferences;discharge planning    Anticipated Changes Related to Illness none    Equipment Needed After Discharge none    Outpatient/Agency/Support Group Needs skilled nursing facility    Discharge Facility/Level of Care Needs nursing facility, skilled               Discharge Plan     Row Name 04/24/23 1412       Plan    Plan Return to [facility], [facility] approval good through 4/30.    Patient/Family in Agreement with Plan yes    Plan Comments CM confirmed with facility liaison Shanel that pt is from [facility], with [facility] approval good through 4/30. Admitted 3/30. [facility] contacted [facility] and provided hospitalization details. Dr Christensen's  phone number provided for MD communication. CM spoke to patient's sister Alina by phone. Pt is originally from [place], has been in [place] about 3 years. Sister plans on coming in to town with her  and niece to see pt and would like to eventually get pt back to [place]. Notified sister that she would need to make sure pt was cleared medically and had physician appts arranged [facility] in [facility]. [facility] plans for [facility] to [facility] if [facility] or return to [place].              Continued Care and Services - Admitted Since 4/22/2023     Destination     Service Provider Request Status Selected Services Address Phone Fax Patient Preferred    [facility] Accepted N/A [address] [place] [address] [phone] --              Expected Discharge Date and Time     Expected Discharge Date Expected Discharge Time    Apr 28, 2023          Demographic Summary     Row Name 04/24/23 1400       General Information    Admission Type inpatient    Arrived From emergency department    Referral Source admission list    Reason for Consult discharge planning;care coordination/care conference    Preferred Language English       Contact Information    Permission Granted to Share Info With                Functional Status     Row Name 04/24/23 1401       Functional Status    Usual Activity Tolerance moderate    Current Activity Tolerance moderate       Functional Status, IADL    Medications assistive equipment and person    Meal Preparation assistive equipment and person    Housekeeping assistive equipment and person    Laundry assistive equipment and person    Shopping assistive equipment and person       Employment/    Employment Status disabled;, previous service           Current or Previous  Service [facility] primary care provider     [employer] Megan Naegele, RN      Office Phone: [phone]  Office Cell:  [phone]

## 2023-04-24 NOTE — CASE MANAGEMENT/SOCIAL WORK
Social Work Assessment   [facility]     Patient Name: Seth Marie  MRN: [id number]  Today's Date: 4/24/2023    Admit Date: 4/22/2023       Discharge Plan     Row Name 04/24/23 1823       Plan    Plan Comments LSW notified by RN that [facility] . LSW called Dr. Sierra back at [phone] she stated patient has been in the homeless veterans program, however no longer qualifies for their [facility] program. She discussed being in communication with patient's sister and we can reach out to pt.'s sister if patient is transferred to [facility]. CM is working on transferring [facility] to [facility], see CM note.                 Phone communication or documentation only - no physical contact with patient or family.      REYNOLD Villareal, W    Phone: [phone]  Cell: [phone]  Fax: [phone]  [email or web address]

## 2023-04-24 NOTE — PROGRESS NOTES
[facility]   Daily Progress Note    Patient Name: Seth Marie  : 1960  MRN: [id number]  Primary Care Physician:  Rigo De Los Santos MD  Date of admission: 2023  Date and Time of Service: 2023 at 0 930      Subjective      Chief Complaint: Shortness of breath      Brief Patient Summary:  Seth Marie is a 63 y.o. male admitted from SNF with increased oxygen requirements, antibiotics initiated for potential pneumonia on admission, all markers negative for sepsis and atypical infection, white count normal, chest x-ray infiltrates and edema slightly improved on chest x-ray today, maintaining oxygenation on chronic O2 level, Zithromax and Rocephin discontinued.  Cardiology following for CHF exacerbation, echo with severe mitral and tricuspid regurgitation.  Nephrology following for BRET on CKD IV, diuresis on hold for now with stable oxygen requirements at his baseline, urology placed John catheter for retention and increased Flomax.  Patient continues to be confused, increased agitation/delirium overnight, removing oxygen, IVs, etc, will check ammonia level. Sister attempting to obtain POA as patient has dementia, she is currently en route from [place], and would prefer he be [facility].  Case management aware and assisting with transfer to [facility] once accepting physician obtained.  Palliative care consult while in our hospital to assist with goals.    Patient Reports answers unintelligible    Subjective:  Symptoms:  No shortness of breath or chest pain.    Diet:  No nausea.    Pain:  He reports no pain.          Objective      Vitals:   Temp:  [97.6 °F (36.4 °C)-97.8 °F (36.6 °C)] 97.6 °F (36.4 °C)  Heart Rate:  [61-83] 61  Resp:  [20-28] 20  BP: (103-132)/(67-98) 121/94  Flow (L/min):  [4] 4  Objective:  General Appearance:  Comfortable.    Vital signs: (most recent): Blood pressure 106/85, pulse 60, temperature 97.3 °F (36.3 °C), temperature  "source Axillary, resp. rate 16, height 167.6 cm (66\"), weight 68.8 kg (151 lb 10.8 oz), SpO2 96 %.    Lungs:  Normal effort and normal respiratory rate.  There are rales (bilateral bases).  (O2 at 4 liters)  Heart: Normal rate.  Regular rhythm.  S1 normal and S2 normal.  Positive for murmur.    Extremities: There is no dependent edema.    Neurological: Patient is alert.    Skin:  Warm and dry.                    Result Review    Result Review:  I have personally reviewed the results from the time of this admission to 4/24/2023 08:07 EDT and agree with these findings:  [x]  Laboratory  []  Microbiology  [x]  Radiology  []  EKG/Telemetry   []  Cardiology/Vascular   []  Pathology  []  Old records  []  Other:  Most notable findings include: See summary and plan          Assessment & Plan        albuterol, 2.5 mg, Nebulization, Q6H - RT  apixaban, 5 mg, Oral, BID  atorvastatin, 80 mg, Oral, Q PM  buPROPion SR, 150 mg, Oral, Daily  cefTRIAXone, 1 g, Intravenous, Daily  ferrous sulfate, 324 mg, Oral, Daily With Breakfast  folic acid, 1 mg, Oral, Daily  metoprolol succinate XL, 12.5 mg, Oral, Q24H  mexiletine, 150 mg, Oral, TID  pantoprazole, 40 mg, Oral, Q AM  polyethylene glycol, 17 g, Oral, Daily  sodium bicarbonate, 1,300 mg, Oral, TID  sodium chloride, 10 mL, Intravenous, Q12H  tamsulosin, 0.8 mg, Oral, Nightly  traZODone, 50 mg, Oral, Nightly             Active Hospital Problems:  Active Hospital Problems    Diagnosis    • **Multifocal pneumonia      Plan:   #Shortness of breath  #Pneumonia, community acquired  -Follow CBC----white count normal  -Lactic acid normal, viral panel negative  -Urine Legionella and strep pneumococcal negative, Zithromax discontinued yesterday  -Improved infiltrates, DC Rocephin today     #Acute on chronic systolic heart failure, HFrEF, EF 20%  #CardioMEMs placed 12/2022  #Chronic O2 at 3 to 6 L  -Currently requiring 4 L  -Maintain O2 sat 88 to 92%  -Cardiology following  -Echocardiogram " with severe MR and TR     #Elevated troponin, initial level 155  #Elevated LFTs  -Troponins trending down  -LFTs trending down  -Review of left heart cath 12/21/2022 at [facility] demonstrated nonobstructive CAD     #Nonischemic cardiomyopathy, EF 20%  #Ventricular tachycardia, paroxysmal atrial fibrillation  -Dual-chamber AICD in situ   -Resume home meds     #BRET on CKD 4, baseline creatinine 3.5-3.8  -Follow CMP----creatinine stable at 3.5-3.7  -Nephrology following     #HTN  -Toprol XL 12.5 mg daily for now  -Periods of hypotension noted, continue holding home antihypertensives  -Hypertensive on admission, Apresoline as needed     #Hyperlipidemia  -Statin     #Delirium  #Dementia  #Previous EtOH dependence  #Depressive disorder  #Previous fungal meningitis  -Anticipate return to nursing home at discharge  -Reported mental status at baseline  -Resume home medications    -Check ammonia level     DVT prophylaxis:  Medical and mechanical DVT prophylaxis orders are present.    CODE STATUS:    Code Status (Patient has no pulse and is not breathing): CPR (Attempt to Resuscitate)  Medical Interventions (Patient has pulse or is breathing): Full Support  Release to patient: Routine Release      Disposition:  I expect patient to be discharged 4 to 5 days.    Plan of care discussed with bedside nurse, attending MD    This patient has been examined wearing appropriate Personal Protective Equipment   Electronically signed by IRINOE Longoria, 04/24/23, 08:07 EDT.  [facility] Hospitalist Team

## 2023-04-24 NOTE — NURSING NOTE
Patient is triggering + for sepsis. On call provider for Dr. Christensen, Dr. Guevara, notified. NNO at this time.

## 2023-04-24 NOTE — NURSING NOTE
Nurse called to room by med sitter as patient was pulling on cheng, nurse went to room to check on patient. Patient had blood running in the cheng catheter tube and had pulled the cheng out with balloon inflated. Urethra had blood pouring out of it. Pressure was applied to try and stop bleeding. Urology was called. Orders were given to replace cheng catheter with an 18 [place]. Cheng replaced. Patient fighting nurses and aids at this time during insertion. Orders also received for 2 point restraints. Cheng was manually irrigated to try to help clear blood. Will continue to monitor.

## 2023-04-24 NOTE — PROGRESS NOTES
"RENAL/KCC:     LOS: 1 day    Patient Care Team:  Rigo De Los Santos MD as PCP - General (Family Medicine)    Chief Complaint:  BRET/CKD    Subjective     Interval History:   Chart reviewed, discussed with nurse  Unable to provide reliable ROS  Not voiding on his own    Objective     Vital Sign Min/Max for last 24 hours  Temp  Min: 97.6 °F (36.4 °C)  Max: 97.8 °F (36.6 °C)   BP  Min: 103/84  Max: 132/93   Pulse  Min: 61  Max: 83   Resp  Min: 20  Max: 28   SpO2  Min: 84 %  Max: 99 %   Flow (L/min)  Min: 4  Max: 4   Weight  Min: 68.8 kg (151 lb 10.8 oz)  Max: 68.8 kg (151 lb 10.8 oz)     Flowsheet Rows    Flowsheet Row First Filed Value   Admission Height 167.6 cm (66\") Documented at 04/22/2023 0555   Admission Weight 72.6 kg (160 lb) Documented at 04/22/2023 0555          No intake/output data recorded.  I/O last 3 completed shifts:  In: 350 [P.O.:350]  Out: -     Physical Exam:  GEN: Awake, NAD  ENT: PERRL, EOMI, MMM  NECK: Supple, no JVD  CHEST: CTAB, no W/R/C  CV: RRR, no M/G/R  ABD: Soft, NT, +BS  SKIN: Warm and Dry  NEURO: CN's intact      WBC WBC   Date Value Ref Range Status   04/24/2023 3.80 3.40 - 10.80 10*3/mm3 Final   04/23/2023 4.00 3.40 - 10.80 10*3/mm3 Final   04/22/2023 5.00 3.40 - 10.80 10*3/mm3 Final      HGB Hemoglobin   Date Value Ref Range Status   04/24/2023 12.7 (L) 13.0 - 17.7 g/dL Final   04/23/2023 13.4 13.0 - 17.7 g/dL Final   04/22/2023 14.5 13.0 - 17.7 g/dL Final      HCT Hematocrit   Date Value Ref Range Status   04/24/2023 40.9 37.5 - 51.0 % Final   04/23/2023 43.7 37.5 - 51.0 % Final   04/22/2023 45.4 37.5 - 51.0 % Final      Platlets No results found for: LABPLAT   MCV MCV   Date Value Ref Range Status   04/24/2023 91.9 79.0 - 97.0 fL Final   04/23/2023 94.4 79.0 - 97.0 fL Final   04/22/2023 91.7 79.0 - 97.0 fL Final          Sodium Sodium   Date Value Ref Range Status   04/24/2023 141 136 - 145 mmol/L Final   04/23/2023 147 (H) 136 - 145 mmol/L Final   04/22/2023 146 (H) 136 - 145 " mmol/L Final      Potassium Potassium   Date Value Ref Range Status   04/24/2023 5.0 3.5 - 5.2 mmol/L Final     Comment:     Slight hemolysis detected by analyzer. Results may be affected.   04/23/2023 4.3 3.5 - 5.2 mmol/L Final   04/22/2023 4.5 3.5 - 5.2 mmol/L Final     Comment:     Slight hemolysis detected by analyzer. Results may be affected.      Chloride Chloride   Date Value Ref Range Status   04/24/2023 107 98 - 107 mmol/L Final   04/23/2023 110 (H) 98 - 107 mmol/L Final   04/22/2023 106 98 - 107 mmol/L Final      CO2 CO2   Date Value Ref Range Status   04/24/2023 16.0 (L) 22.0 - 29.0 mmol/L Final   04/23/2023 21.0 (L) 22.0 - 29.0 mmol/L Final   04/22/2023 22.0 22.0 - 29.0 mmol/L Final      BUN BUN   Date Value Ref Range Status   04/24/2023 80 (H) 8 - 23 mg/dL Final   04/23/2023 78 (H) 8 - 23 mg/dL Final   04/22/2023 76 (H) 8 - 23 mg/dL Final      Creatinine Creatinine   Date Value Ref Range Status   04/24/2023 3.78 (H) 0.76 - 1.27 mg/dL Final   04/23/2023 3.53 (H) 0.76 - 1.27 mg/dL Final   04/22/2023 3.54 (H) 0.76 - 1.27 mg/dL Final      Calcium Calcium   Date Value Ref Range Status   04/24/2023 9.7 8.6 - 10.5 mg/dL Final   04/23/2023 10.2 8.6 - 10.5 mg/dL Final   04/22/2023 10.4 8.6 - 10.5 mg/dL Final      PO4 No results found for: CAPO4   Albumin Albumin   Date Value Ref Range Status   04/24/2023 2.8 (L) 3.5 - 5.2 g/dL Final   04/23/2023 3.0 (L) 3.5 - 5.2 g/dL Final   04/22/2023 3.5 3.5 - 5.2 g/dL Final      Magnesium Magnesium   Date Value Ref Range Status   04/23/2023 2.3 1.6 - 2.4 mg/dL Final   04/22/2023 2.2 1.6 - 2.4 mg/dL Final      Uric Acid No results found for: URICACID        Results Review:     I reviewed the patient's new clinical results.    albuterol, 2.5 mg, Nebulization, Q6H - RT  apixaban, 5 mg, Oral, BID  atorvastatin, 80 mg, Oral, Q PM  buPROPion SR, 150 mg, Oral, Daily  cefTRIAXone, 1 g, Intravenous, Daily  ferrous sulfate, 324 mg, Oral, Daily With Breakfast  folic acid, 1 mg, Oral,  Daily  metoprolol succinate XL, 12.5 mg, Oral, Q24H  mexiletine, 150 mg, Oral, TID  pantoprazole, 40 mg, Oral, Q AM  polyethylene glycol, 17 g, Oral, Daily  sodium bicarbonate, 1,300 mg, Oral, TID  sodium chloride, 10 mL, Intravenous, Q12H  tamsulosin, 0.8 mg, Oral, Nightly  traZODone, 50 mg, Oral, Nightly           Medication Review: Reviewed    Assessment & Plan     1) BRET vs CKD progression  2) CKD4 - Cr 2.8 in January - now in the mid 3's  3) Urinary retention  4) Dysuria  5) CHF  6) PNA  7) HTN  8) Metabolic acidosis    Plan: Difficult situation.  Cr 3.7 this AM.  No emergent need for HD but will discuss goals of care with family.  Consult  for bladder retention and straight cath prn as able.  ABX per primary.  Diuretics prn.  Volume stable today.  Avoid nephrotoxins.  Add scheduled bicarb.  Will follow.      Tho Pires MD   [facility]  04/24/23  08:20 EDT

## 2023-04-24 NOTE — PROGRESS NOTES
Referring Provider: Bernard Christensen MD    Reason for follow-up: Heart failure exacerbation, elevated troponin, cardiomyopathy     Patient Care Team:  Rigo De Los Santos MD as PCP - General (Family Medicine)      SUBJECTIVE  He is not able to have a conversation.  Very somnolent and mumbles.     ROS  Review of all systems negative except as indicated.    Since I have last seen, the patient has been without any chest discomfort, shortness of breath, palpitations, dizziness or syncope.  Denies having any headache, abdominal pain, nausea, vomiting, diarrhea, constipation, loss of weight or loss of appetite.  Denies having any excessive bruising, hematuria or blood in the stool.  ROS      Personal History:    Past Medical History:   Diagnosis Date   • Adjustment disorder    • Alcohol dependence    • Anxiety    • Atrial fibrillation    • CHF (congestive heart failure)    • CKD (chronic kidney disease) stage 4, GFR 15-29 ml/min    • Dementia    • Depression    • Elevated cholesterol    • Encephalopathy    • GERD (gastroesophageal reflux disease)    • Hepatitis C    • Hyperlipidemia    • Hypertension        Past Surgical History:   Procedure Laterality Date   • PACEMAKER REPLACEMENT         History reviewed. No pertinent family history.    Social History     Tobacco Use   • Smoking status: Former     Types: Cigarettes   Vaping Use   • Vaping Use: Never used   Substance Use Topics   • Alcohol use: Not Currently   • Drug use: Not Currently     Types: Heroin        Medications Prior to Admission   Medication Sig Dispense Refill Last Dose   • albuterol sulfate  (90 Base) MCG/ACT inhaler Inhale 2 puffs Every 4 (Four) Hours As Needed for Wheezing.      • apixaban (ELIQUIS) 5 MG tablet tablet Take 1 tablet by mouth 2 (Two) Times a Day.      • atorvastatin (LIPITOR) 80 MG tablet Take 1 tablet by mouth Every Evening.      • buPROPion SR (WELLBUTRIN SR) 150 MG 12 hr tablet Take 1 tablet by mouth Daily.      • carvedilol  (COREG) 6.25 MG tablet Take 1 tablet by mouth 2 (Two) Times a Day With Meals.      • Cholecalciferol 25 MCG (1000 UT) tablet Take 2 tablets by mouth Daily.      • ferrous sulfate 325 (65 FE) MG tablet Take 1 tablet by mouth Daily With Breakfast.      • finasteride (PROSCAR) 5 MG tablet Take 1 tablet by mouth Daily.      • folic acid (FOLVITE) 1 MG tablet Take 1 tablet by mouth Daily.      • furosemide (LASIX) 40 MG tablet Take 1 tablet by mouth Daily.      • hydrALAZINE (APRESOLINE) 10 MG tablet Take 1 tablet by mouth 3 (Three) Times a Day.      • ipratropium-albuterol (DUO-NEB) 0.5-2.5 mg/3 ml nebulizer Take 3 mL by nebulization Every 4 (Four) Hours As Needed for Wheezing.      • isosorbide dinitrate (ISORDIL) 10 MG tablet Take 1 tablet by mouth 3 (Three) Times a Day.      • Lidocaine 4 % patch Apply 2 patches topically Every Morning.      • Menthol-Zinc Oxide (CALMOSEPTINE EX) Apply 1 application topically 2 (Two) Times a Day.      • mexiletine (MEXITIL) 150 MG capsule Take 1 capsule by mouth 3 (Three) Times a Day.      • naloxone (NARCAN) 4 MG/0.1ML nasal spray 1 spray into the nostril(s) as directed by provider As Needed.      • nitroglycerin (NITROLINGUAL) 0.4 MG/SPRAY spray Place 1 spray under the tongue Every 5 (Five) Minutes As Needed for Chest Pain.      • omeprazole (priLOSEC) 20 MG capsule Take 1 capsule by mouth Daily.      • polyethylene glycol (MIRALAX) 17 g packet Take 17 g by mouth Daily.      • sodium bicarbonate 650 MG tablet Take 2 tablets by mouth 2 (Two) Times a Day.      • tamsulosin (FLOMAX) 0.4 MG capsule 24 hr capsule Take 2 capsules by mouth Every Night.      • traZODone (DESYREL) 50 MG tablet Take 1 tablet by mouth Every Night.          Allergies:  Pork-derived products and Protonix [pantoprazole]    Scheduled Meds:albuterol, 2.5 mg, Nebulization, Q6H - RT  apixaban, 5 mg, Oral, BID  atorvastatin, 80 mg, Oral, Q PM  buPROPion SR, 150 mg, Oral, Daily  ferrous sulfate, 324 mg, Oral, Daily  "With Breakfast  folic acid, 1 mg, Oral, Daily  metoprolol succinate XL, 12.5 mg, Oral, Q24H  mexiletine, 150 mg, Oral, TID  pantoprazole, 40 mg, Oral, Q AM  polyethylene glycol, 17 g, Oral, Daily  sodium bicarbonate, 1,300 mg, Oral, TID  sodium chloride, 10 mL, Intravenous, Q12H  tamsulosin, 0.8 mg, Oral, Nightly  traZODone, 50 mg, Oral, Nightly      Continuous Infusions:   PRN Meds:.•  acetaminophen **OR** acetaminophen **OR** acetaminophen  •  hydrALAZINE  •  magnesium sulfate **OR** magnesium sulfate **OR** magnesium sulfate  •  melatonin  •  nitroglycerin  •  ondansetron **OR** ondansetron  •  potassium chloride  •  potassium chloride  •  sodium chloride  •  sodium chloride  •  sodium chloride  •  traMADol      OBJECTIVE    Vital Signs  Vitals:    04/24/23 0600 04/24/23 0734 04/24/23 1117 04/24/23 1128   BP:  121/94 106/85    BP Location:  Right arm Left arm    Patient Position:  Lying Lying    Pulse:  61 66 60   Resp:  20 16    Temp:  97.6 °F (36.4 °C) 97.3 °F (36.3 °C)    TempSrc:  Axillary Axillary    SpO2:  98% 100% 96%   Weight: 68.8 kg (151 lb 10.8 oz)      Height:           Flowsheet Rows    Flowsheet Row First Filed Value   Admission Height 167.6 cm (66\") Documented at 04/22/2023 0555   Admission Weight 72.6 kg (160 lb) Documented at 04/22/2023 0555            Intake/Output Summary (Last 24 hours) at 4/24/2023 1248  Last data filed at 4/24/2023 0952  Gross per 24 hour   Intake 220 ml   Output 400 ml   Net -180 ml          Telemetry: Sinus rhythm    Physical Exam:  The patient is very somnolent and disoriented  Vital signs as noted above.  Head and neck revealed no carotid bruits or jugular venous distention.  No thyromegaly or lymphadenopathy is present  Lungs clear.  No wheezing.  Breath sounds are normal bilaterally.  Heart normal first and second heart sounds.  No murmur. No precordial rub is present.  No gallop is present.  Abdomen soft and nontender.  No organomegaly is present.  Extremities with " good peripheral pulses without any pedal edema.  Skin warm and dry.  Musculoskeletal system is grossly normal.  CNS grossly normal.       Results Review:  I have personally reviewed the results from the time of this admission to 4/24/2023 12:48 EDT and agree with these findings:  []  Laboratory  []  Microbiology  []  Radiology  []  EKG/Telemetry   []  Cardiology/Vascular   []  Pathology  []  Old records  []  Other:    Most notable findings include:    Lab Results (last 24 hours)     Procedure Component Value Units Date/Time    Blood Culture - Blood, Arm, Left [[id number]]  (Normal) Collected: 04/22/23 0825    Specimen: Blood from Arm, Left Updated: 04/24/23 0830     Blood Culture No growth at 2 days    Blood Culture - Blood, Arm, Left [[id number]]  (Normal) Collected: 04/22/23 0825    Specimen: Blood from Arm, Left Updated: 04/24/23 0830     Blood Culture No growth at 2 days    Comprehensive Metabolic Panel [[id number]]  (Abnormal) Collected: 04/24/23 0245    Specimen: Blood Updated: 04/24/23 0401     Glucose 81 mg/dL      BUN 80 mg/dL      Creatinine 3.78 mg/dL      Sodium 141 mmol/L      Potassium 5.0 mmol/L      Comment: Slight hemolysis detected by analyzer. Results may be affected.        Chloride 107 mmol/L      CO2 16.0 mmol/L      Calcium 9.7 mg/dL      Total Protein 6.3 g/dL      Albumin 2.8 g/dL      ALT (SGPT) 25 U/L      AST (SGOT) 41 U/L      Comment: Slight hemolysis detected by analyzer. Results may be affected.        Alkaline Phosphatase 199 U/L      Total Bilirubin 1.1 mg/dL      Globulin 3.5 gm/dL      A/G Ratio 0.8 g/dL      BUN/Creatinine Ratio 21.2     Anion Gap 18.0 mmol/L      eGFR 16.9 mL/min/1.73     Narrative:      GFR Normal >60  Chronic Kidney Disease <60  Kidney Failure <15      High Sensitivity Troponin T 2Hr [[id number]]  (Abnormal) Collected: 04/24/23 0245    Specimen: Blood Updated: 04/24/23 0401     HS Troponin T 120 ng/L      Troponin T Delta -10 ng/L     Narrative:      High  Sensitive Troponin T Reference Range:  <10.0 ng/L- Negative Female for AMI  <15.0 ng/L- Negative Male for AMI  >=10 - Abnormal Female indicating possible myocardial injury.  >=15 - Abnormal Male indicating possible myocardial injury.   Clinicians would have to utilize clinical acumen, EKG, Troponin, and serial changes to determine if it is an Acute Myocardial Infarction or myocardial injury due to an underlying chronic condition.         CBC Auto Differential [[id number]]  (Abnormal) Collected: 04/24/23 0302    Specimen: Blood Updated: 04/24/23 0329     WBC 3.80 10*3/mm3      RBC 4.46 10*6/mm3      Hemoglobin 12.7 g/dL      Hematocrit 40.9 %      MCV 91.9 fL      MCH 28.4 pg      MCHC 30.9 g/dL      RDW 19.3 %      RDW-SD 66.1 fl      MPV 9.5 fL      Platelets 130 10*3/mm3      Neutrophil % 75.6 %      Lymphocyte % 18.6 %      Monocyte % 4.2 %      Eosinophil % 1.2 %      Basophil % 0.4 %      Neutrophils, Absolute 2.90 10*3/mm3      Lymphocytes, Absolute 0.70 10*3/mm3      Monocytes, Absolute 0.20 10*3/mm3      Eosinophils, Absolute 0.00 10*3/mm3      Basophils, Absolute 0.00 10*3/mm3      nRBC 1.4 /100 WBC           Imaging Results (Last 24 Hours)     Procedure Component Value Units Date/Time    XR Chest 1 View [[id number]] Collected: 04/24/23 0736     Updated: 04/24/23 0740    Narrative:      XR CHEST 1 VW    Date of Exam: 4/24/2023 4:00 AM EDT    Indication: pulmonary edema  post op    Comparison: 4/23/2023    Findings:  Left subclavian transvenous pacemaker remains in place unchanged in position. Cardiomegaly is stable. There is been no change in bilateral airspace disease. No definite pleural effusion. No evidence of pneumothorax.     Impression:      Impression:    1. Stable cardiomegaly.  2. No change in bilateral airspace disease.      Electronically Signed: Carlitos Lovell    4/24/2023 7:37 AM EDT    Workstation ID: [id number]          LAB RESULTS (LAST 7 DAYS)    CBC  Results from last 7 days   Lab Units  04/24/23  0302 04/23/23  0917 04/22/23 0618   WBC 10*3/mm3 3.80 4.00 5.00   RBC 10*6/mm3 4.46 4.63 4.94   HEMOGLOBIN g/dL 12.7* 13.4 14.5   HEMATOCRIT % 40.9 43.7 45.4   MCV fL 91.9 94.4 91.7   PLATELETS 10*3/mm3 130* 160 169       BMP  Results from last 7 days   Lab Units 04/24/23  0245 04/23/23  0917 04/22/23 0618   SODIUM mmol/L 141 147* 146*   POTASSIUM mmol/L 5.0 4.3 4.5   CHLORIDE mmol/L 107 110* 106   CO2 mmol/L 16.0* 21.0* 22.0   BUN mg/dL 80* 78* 76*   CREATININE mg/dL 3.78* 3.53* 3.54*   GLUCOSE mg/dL 81 105* 107*   MAGNESIUM mg/dL  --  2.3 2.2   PHOSPHORUS mg/dL  --  4.1 3.1       CMP   Results from last 7 days   Lab Units 04/24/23 0245 04/23/23 0917 04/22/23 0618   SODIUM mmol/L 141 147* 146*   POTASSIUM mmol/L 5.0 4.3 4.5   CHLORIDE mmol/L 107 110* 106   CO2 mmol/L 16.0* 21.0* 22.0   BUN mg/dL 80* 78* 76*   CREATININE mg/dL 3.78* 3.53* 3.54*   GLUCOSE mg/dL 81 105* 107*   ALBUMIN g/dL 2.8* 3.0* 3.5   BILIRUBIN mg/dL 1.1 1.2 1.9*   ALK PHOS U/L 199* 218* 265*   AST (SGOT) U/L 41* 43* 52*   ALT (SGPT) U/L 25 27 33       BNP        TROPONIN  Results from last 7 days   Lab Units 04/24/23 0245   HSTROP T ng/L 120*       CoAg        Creatinine Clearance  Estimated Creatinine Clearance: 19 mL/min (A) (by C-G formula based on SCr of 3.78 mg/dL (H)).    ABG        Radiology  XR Chest 1 View    Result Date: 4/24/2023  Impression: 1. Stable cardiomegaly. 2. No change in bilateral airspace disease. Electronically Signed: Carlitos Tran/24/2023 7:37 AM EDT  Workstation ID: [id number]    XR Chest 1 View    Result Date: 4/23/2023  Impression: Cardiomegaly with bilateral airspace disease which may represent edema or multifocal infiltrates. Findings are similar or slightly improved since 4/22/2023. Electronically Signed: Mohinder Pérez  4/23/2023 8:47 AM EDT  Workstation ID: [id number]    US Renal Bilateral    Result Date: 4/22/2023  Impression: 1.Increased renal cortical echogenicity, which can be seen with chronic  medical renal disease. 2.No hydronephrosis. 3.Incidental ascites. Electronically Signed: Emily Webster  4/22/2023 3:45 PM EDT  Workstation ID: [id number]        EKG  I personally viewed and interpreted the patient's EKG/Telemetry data:  ECG 12 Lead Dyspnea   Final Result   HEART RATE= 88  bpm   RR Interval= 680  ms   FL Interval= 167  ms   P Horizontal Axis= 2  deg   P Front Axis= 49  deg   QRSD Interval= 110  ms   QT Interval= 433  ms   QRS Axis= -53  deg   T Wave Axis= 133  deg   - ABNORMAL ECG -   Sinus rhythm   LAD, consider left anterior fascicular block   Low voltage, extremity leads   Anteroseptal infarct, old   Nonspecific T abnormalities, lateral leads   Prolonged QT interval   No previous ECG available for comparison   Electronically Signed By: Tony Deleon (Dennis) 22-Apr-2023 10:27:58   Date and Time of Study: 2023-04-22 06:25:45      SCANNED - TELEMETRY     Final Result      SCANNED - TELEMETRY     Final Result      SCANNED - TELEMETRY     Final Result            Echocardiogram:    Results for orders placed during the hospital encounter of 04/22/23    Adult Transthoracic Echo Complete W/ Cont if Necessary Per Protocol    Interpretation Summary  •  Left ventricular systolic function is severely decreased. Calculated left ventricular EF = 15% Left ventricular ejection fraction appears to be less than 20%.  •  The left ventricular cavity is moderate to severely dilated.  •  Left ventricular diastolic dysfunction is noted.  •  Severely reduced right ventricular systolic function noted.  •  The right ventricular cavity is dilated.  •  The right atrial cavity is severely  dilated.  •  Severe mitral valve regurgitation is present.  •  Severe tricuspid valve regurgitation is present.  •  Estimated right ventricular systolic pressure from tricuspid regurgitation is markedly elevated (>55 mmHg).  •  Severe pulmonary hypertension is present.        Stress Test:         Cardiac Catheterization:  No results found for  this or any previous visit.         Other:         ASSESSMENT & PLAN:    Principal Problem:    Multifocal pneumonia    CHF exacerbation/HFrEF  Previous echocardiogram showed EF of 32% with end-diastolic dimension of 5 cm, severe pulmonary hypertension, severe TR RVSP 51 mmHg.  Previous cardiac catheterization in December 2022 showed nonobstructive coronary disease with slow flow phenomena.  Cardiac MRI with no evidence of amyloid  Restart Toprol-XL 25 mg p.o. daily  Unable to start ACE inhibitor, ARNI or SGLT2 due to renal dysfunction.  GFR is less than 20  Previous attempts were made for afterload reduction with hydralazine and Isordil.  Blood pressure has improved.  Start low-dose hydralazine for afterload reduction  Repeat echo shows EF of 20%, severe biventricular failure with severe mitral and tricuspid regurgitation with severe pulmonary hypertension  1 dose of IV Lasix  Can offer a right heart cath for hemodynamic monitoring  Previous right heart cath showed cardiac index of 1.6  He has CardioMEMS in place  He also has ICD  Palliative and hospice consult due to poor long-term prognosis.     Pneumonia  Completed IV antibiotics for pneumonia  Oxygen supplementation as needed     Atrial fibrillation  Continue full dose anticoagulation with Eliquis 5 mg twice daily  Rate control with metoprolol XL     Hyperlipidemia  High intensity statin     Chronic kidney disease  Poor renal function with GFR of less than 20.  Avoid nephrotoxins.  Likely due to cardiorenal.  Creatinine is 3.5, BUN is 78  He may end up on hemodialysis  Discussed goals of care     Neuropathy  Continue gabapentin  Previous fungal meningitis and alcohol dependence      Overall prognosis for this patient is very poor    Ousmane Ho MD  04/24/23  12:48 EDT

## 2023-04-24 NOTE — PLAN OF CARE
Goal Outcome Evaluation:              Outcome Evaluation: Patient is alert to self and keeps taking off his oxygen. Urology placed cheng catheter, pt trying to pull it out. Bed alarm and call light is within reach. Minerva is monitoring pt.

## 2023-04-24 NOTE — DISCHARGE SUMMARY
[facility] Medicine Services  Discharge Summary    Date of Service: 2023  Patient Name: Seth Marie  : 1960  MRN: [id number]    Date of Admission: 2023  Date of Discharge: 2023    Discharge Diagnosis: Acute on chronic systolic heart failure, HFrEF EF 20%, CardioMEMS placed 2022, dual-chamber AICD in situ, chronic O2 at 3 to 6 L, severe MR and TR on echocardiogram, previous paroxysmal atrial fibrillation and ventricular tachycardia, acute on chronic kidney disease stage IV, hyperlipidemia, delirium on dementia, previous EtOH dependence, depressive disorder, and previous fungal meningitis    Primary Care Physician: Rigo De Los Santos MD      Presenting Problem:   Multifocal pneumonia [J18.9]  Dyspnea, unspecified type [R06.00]  Stage 4 chronic kidney disease [N18.4]    Active and Resolved Hospital Problems:  Active Hospital Problems    Diagnosis POA   • **Multifocal pneumonia [J18.9] Yes   • Acute on chronic systolic heart failure (CMS/[facility]) [I50.23] Unknown      Resolved Hospital Problems   No resolved problems to display.         Hospital Course     Hospital Course:  Seth Marie is a 63 y.o. male admitted from SNF with increased oxygen requirements, antibiotics initiated for potential pneumonia on admission, all markers negative for sepsis and atypical infection, white count normal, chest x-ray infiltrates and edema slightly improved on chest x-ray today, maintaining oxygenation on chronic O2 level, Zithromax and Rocephin discontinued.  Cardiology following for CHF exacerbation, initial high-sensitivity troponin 155 trending down, review of left heart cath 2022 at [facility] demonstrated nonobstructive CAD.  Echo with severe mitral and tricuspid regurgitation, and EF 20%, Coreg discontinued from nursing home and initiation of low-dose Toprol with labile blood pressures.  LFTs initially elevated and are trending down to almost normal.  Nephrology  following for BRET on CKD IV most recent creatinine maintaining 3.5-3.8, diuresis on hold for now with stable oxygen requirements at his baseline, continuing to follow creatinine as patient may ultimately need hemodialysis.  Urology placed John catheter for retention and increased Flomax, unfortunately patient pulled out his catheter today and it had to be replaced under cystoscopy due to significant trauma, catheter will need to be in place for at least 1 week to let the urethra heal.  Patient continues to be confused, increased agitation/delirium, removing oxygen, IVs, etc, ammonia level negative.  Began having hypothermia and then hyperthermia overnight, possibly related to urethra irritation, but chest x-ray redemonstrates bilateral lower lobe infiltrate, have reinitiated Rocephin IV today.  Sister attempting to obtain POA as patient has dementia, she is currently en route from [place], and would prefer he be transferred to [facility].  Dr. Christensen has discussed transfer with Dr. Meeks at [facility], they have accepted and plan for transfer today.        DISCHARGE Follow Up Recommendations for labs and diagnostics: None      Reasons For Change In Medications and Indications for New Medications: See above      Day of Discharge     Vital Signs:  Temp:  [90.3 °F (32.4 °C)-100.6 °F (38.1 °C)] 97.5 °F (36.4 °C)  Heart Rate:  [60-90] 75  Resp:  [16-25] 25  BP: ()/(60-93) 124/93  Flow (L/min):  [3-4] 3    Physical Exam:  Physical Exam  Constitutional:       Appearance: He is ill-appearing.   Cardiovascular:      Rate and Rhythm: Normal rate and regular rhythm.      Pulses: Normal pulses.      Heart sounds: No murmur heard.  Pulmonary:      Effort: Pulmonary effort is normal.      Breath sounds: Normal breath sounds. No wheezing or rales.      Comments: O2 at 3 L  Abdominal:      General: There is no distension.   Musculoskeletal:      Right lower leg: No edema.      Left lower leg: No edema.   Skin:      General: Skin is warm and dry.   Neurological:      Comments: Confused, unable to understand speech/mumbles            Pertinent  and/or Most Recent Results     LAB RESULTS:      Lab 04/25/23  0610 04/24/23  1820 04/24/23  0302 04/23/23  0917 04/22/23  0828 04/22/23  0618   WBC 4.50  --  3.80 4.00  --  5.00   HEMOGLOBIN 10.4* 11.1* 12.7* 13.4  --  14.5   HEMATOCRIT 34.2* 36.9* 40.9 43.7  --  45.4   PLATELETS 152  --  130* 160  --  169   NEUTROS ABS 3.60  --  2.90 3.10  --  4.00   LYMPHS ABS 0.60*  --  0.70 0.70  --  0.60*   MONOS ABS 0.30  --  0.20 0.20  --  0.30   EOS ABS 0.00  --  0.00 0.00  --  0.00   MCV 93.2  --  91.9 94.4  --  91.7   PROCALCITONIN  --   --   --  0.24  --   --    LACTATE  --   --   --   --  1.6  --          Lab 04/25/23  0610 04/24/23  0245 04/23/23  0917 04/22/23  0618   SODIUM 146* 141 147* 146*   POTASSIUM 4.6 5.0 4.3 4.5   CHLORIDE 109* 107 110* 106   CO2 20.0* 16.0* 21.0* 22.0   ANION GAP 17.0* 18.0* 16.0* 18.0*   BUN 93* 80* 78* 76*   CREATININE 4.15* 3.78* 3.53* 3.54*   EGFR 15.1* 16.9* 18.4* 18.3*   GLUCOSE 48* 81 105* 107*   CALCIUM 9.1 9.7 10.2 10.4   MAGNESIUM 2.2  --  2.3 2.2   PHOSPHORUS 4.2  --  4.1 3.1   TSH 7.980*  --   --   --          Lab 04/25/23  0610 04/24/23  0245 04/23/23  0917 04/22/23  0618   TOTAL PROTEIN 5.4* 6.3 6.5 7.5   ALBUMIN 2.5* 2.8* 3.0* 3.5   GLOBULIN 2.9 3.5 3.5 4.0   ALT (SGPT) 25 25 27 33   AST (SGOT) 46* 41* 43* 52*   BILIRUBIN 1.0 1.1 1.2 1.9*   ALK PHOS 170* 199* 218* 265*         Lab 04/24/23  0245 04/23/23  0917 04/22/23  0618   PROBNP  --   --  40,715.0*   HSTROP T 120* 130* 155*                 Brief Urine Lab Results  (Last result in the past 365 days)      Color   Clarity   Blood   Leuk Est   Nitrite   Protein   CREAT   Urine HCG        04/22/23 1429             97.0         04/22/23 1429 Yellow   Clear   Negative   Negative   Negative   >=300 mg/dL (3+)               Microbiology Results (last 10 days)     Procedure Component Value - Date/Time     Legionella Antigen, Urine - Urine, Urine, Clean Catch [[id number]]  (Normal) Collected: 04/22/23 1429    Lab Status: Final result Specimen: Urine, Clean Catch Updated: 04/22/23 1521     LEGIONELLA ANTIGEN, URINE Negative    S. Pneumo Ag Urine or CSF - Urine, Urine, Clean Catch [[id number]]  (Normal) Collected: 04/22/23 1429    Lab Status: Final result Specimen: Urine, Clean Catch Updated: 04/22/23 1521     Strep Pneumo Ag Negative    Respiratory Panel PCR w/COVID-19(SARS-CoV-2) JOSSE/AARON/[facility]/PAD/[facility]/MAD/CARMEN In-House, NP Swab in UTM/VTM, 3-4 HR TAT - Swab, Nasopharynx [[id number]]  (Normal) Collected: 04/22/23 0855    Lab Status: Final result Specimen: Swab from Nasopharynx Updated: 04/22/23 1043     ADENOVIRUS, PCR Not Detected     Coronavirus 229E Not Detected     Coronavirus HKU1 Not Detected     Coronavirus NL63 Not Detected     Coronavirus OC43 Not Detected     COVID19 Not Detected     Human Metapneumovirus Not Detected     Human Rhinovirus/Enterovirus Not Detected     Influenza A PCR Not Detected     Influenza B PCR Not Detected     Parainfluenza Virus 1 Not Detected     Parainfluenza Virus 2 Not Detected     Parainfluenza Virus 3 Not Detected     Parainfluenza Virus 4 Not Detected     RSV, PCR Not Detected     Bordetella pertussis pcr Not Detected     Bordetella parapertussis PCR Not Detected     Chlamydophila pneumoniae PCR Not Detected     Mycoplasma pneumo by PCR Not Detected    Narrative:      In the setting of a positive respiratory panel with a viral infection PLUS a negative procalcitonin without other underlying concern for bacterial infection, consider observing off antibiotics or discontinuation of antibiotics and continue supportive care. If the respiratory panel is positive for atypical bacterial infection (Bordetella pertussis, Chlamydophila pneumoniae, or Mycoplasma pneumoniae), consider antibiotic de-escalation to target atypical bacterial infection.    Blood Culture - Blood, Arm, Left [[id number]]   (Normal) Collected: 04/22/23 0825    Lab Status: Preliminary result Specimen: Blood from Arm, Left Updated: 04/25/23 0845     Blood Culture No growth at 3 days    Blood Culture - Blood, Arm, Left [[id number]]  (Normal) Collected: 04/22/23 0825    Lab Status: Preliminary result Specimen: Blood from Arm, Left Updated: 04/25/23 0830     Blood Culture No growth at 3 days          XR Chest 1 View    Result Date: 4/25/2023  Impression: Impression: 1. Improved bilateral upper lobe aeration compared to 1 day prior. 2. Interstitial and alveolar disease predominantly in the lower lobes is again noted, which may represent changes of edema. Pneumonia not excluded. 3. Stable cardiac enlargement. Electronically Signed: Yadira Mar  4/25/2023 10:20 AM EDT  Workstation ID: [id number]    XR Chest 1 View    Result Date: 4/24/2023  Impression: Impression: 1. Stable cardiomegaly. 2. No change in bilateral airspace disease. Electronically Signed: Carlitos Lovell  4/24/2023 7:37 AM EDT  Workstation ID: [id number]    XR Chest 1 View    Result Date: 4/23/2023  Impression: Impression: Cardiomegaly with bilateral airspace disease which may represent edema or multifocal infiltrates. Findings are similar or slightly improved since 4/22/2023. Electronically Signed: Mohinder Pérez  4/23/2023 8:47 AM EDT  Workstation ID: [id number]    XR Chest 1 View    Result Date: 4/22/2023  Impression: Impression: Patchy airspace opacities throughout both lungs, concerning for multifocal pneumonia and/or pulmonary edema. Electronically Signed: Emily Webster  4/22/2023 7:33 AM EDT  Workstation ID: [id number]    US Renal Bilateral    Result Date: 4/22/2023  Impression: Impression: 1.Increased renal cortical echogenicity, which can be seen with chronic medical renal disease. 2.No hydronephrosis. 3.Incidental ascites. Electronically Signed: Emily Webster  4/22/2023 3:45 PM EDT  Workstation ID: [id number]              Results for orders placed during the hospital encounter of  04/22/23    Adult Transthoracic Echo Complete W/ Cont if Necessary Per Protocol    Interpretation Summary  •  Left ventricular systolic function is severely decreased. Calculated left ventricular EF = 15% Left ventricular ejection fraction appears to be less than 20%.  •  The left ventricular cavity is moderate to severely dilated.  •  Left ventricular diastolic dysfunction is noted.  •  Severely reduced right ventricular systolic function noted.  •  The right ventricular cavity is dilated.  •  The right atrial cavity is severely  dilated.  •  Severe mitral valve regurgitation is present.  •  Severe tricuspid valve regurgitation is present.  •  Estimated right ventricular systolic pressure from tricuspid regurgitation is markedly elevated (>55 mmHg).  •  Severe pulmonary hypertension is present.      Labs Pending at Discharge:  Pending Labs     Order Current Status    Blood Culture - Blood, Arm, Left Preliminary result    Blood Culture - Blood, Arm, Left Preliminary result          Procedures Performed           Consults:   Consults     Date and Time Order Name Status Description    4/24/2023  8:19 AM Inpatient Urology Consult Completed     4/22/2023 12:39 PM Inpatient Nephrology Consult Completed     4/22/2023 12:39 PM Inpatient Cardiology Consult Completed             Discharge Details        Discharge Medications      New Medications      Instructions Start Date   cefTRIAXone 1 g in sodium chloride 0.9 % 100 mL IVPB   1 g, Intravenous, Daily      metoprolol succinate XL 25 MG 24 hr tablet  Commonly known as: TOPROL-XL   12.5 mg, Oral, Every 24 Hours Scheduled         Changes to Medications      Instructions Start Date   sodium bicarbonate 650 MG tablet  What changed: when to take this   1,300 mg, Oral, 3 Times Daily         Continue These Medications      Instructions Start Date   albuterol sulfate  (90 Base) MCG/ACT inhaler  Commonly known as: PROVENTIL HFA;VENTOLIN HFA;PROAIR HFA   2 puffs, Inhalation,  Every 4 Hours PRN      apixaban 5 MG tablet tablet  Commonly known as: ELIQUIS   5 mg, Oral, 2 Times Daily      atorvastatin 80 MG tablet  Commonly known as: LIPITOR   80 mg, Oral, Every Evening      buPROPion  MG 12 hr tablet  Commonly known as: WELLBUTRIN SR   150 mg, Oral, Daily      ferrous sulfate 325 (65 FE) MG tablet   325 mg, Oral, Daily With Breakfast      folic acid 1 MG tablet  Commonly known as: FOLVITE   1 mg, Oral, Daily      ipratropium-albuterol 0.5-2.5 mg/3 ml nebulizer  Commonly known as: DUO-NEB   3 mL, Nebulization, Every 4 Hours PRN      mexiletine 150 MG capsule  Commonly known as: MEXITIL   150 mg, Oral, 3 Times Daily      omeprazole 20 MG capsule  Commonly known as: priLOSEC   20 mg, Oral, Daily      polyethylene glycol 17 g packet  Commonly known as: MIRALAX   17 g, Oral, Daily      tamsulosin 0.4 MG capsule 24 hr capsule  Commonly known as: FLOMAX   0.8 mg, Oral, Nightly      traZODone 50 MG tablet  Commonly known as: DESYREL   50 mg, Oral, Nightly         Stop These Medications    CALMOSEPTINE EX     carvedilol 6.25 MG tablet  Commonly known as: COREG     cholecalciferol 25 MCG (1000 UT) tablet  Commonly known as: VITAMIN D3     finasteride 5 MG tablet  Commonly known as: PROSCAR     furosemide 40 MG tablet  Commonly known as: LASIX     hydrALAZINE 10 MG tablet  Commonly known as: APRESOLINE     isosorbide dinitrate 10 MG tablet  Commonly known as: ISORDIL     Lidocaine 4 % patch     naloxone 4 MG/0.1ML nasal spray  Commonly known as: NARCAN     nitroglycerin 0.4 MG/SPRAY spray  Commonly known as: NITROLINGUAL            Allergies   Allergen Reactions   • Pork-Derived Products Unknown - Low Severity   • Protonix [Pantoprazole] Unknown - Low Severity         [facility]: [facility]      Diet:  Hospital:  Diet Order   Procedures   • Diet: Cardiac Diets, Renal Diets; Healthy Heart (2-3 Na+); Low Potassium; Texture: Regular Texture (IDDSI 7); Fluid Consistency:  Thin (IDDSI 0)         Discharge Activity:         CODE STATUS:  Code Status and Medical Interventions:   Ordered at: 04/23/23 0800     Code Status (Patient has no pulse and is not breathing):    CPR (Attempt to Resuscitate)     Medical Interventions (Patient has pulse or is breathing):    Full Support     Release to patient:    Routine Release         No future appointments.        Time spent on Discharge including face to face service: Greater than 30 minutes    Patient was examined using appropriate personal protective equipment    Signature: Electronically signed by IRINEO Longoria, 04/25/23, 10:59 EDT.  [facility] Hospitalist Team

## 2023-04-24 NOTE — CONSULTS
Consult for Retention    Unhealthy man  Can get no history from him   Getting straight cath'd  Nephrology following - considering dialysis  On flomax  Apparently family is [facility] to [facility] them in [place]    I placed a 16 Syriac coude into the bladder without problems with clear return of urine    Penis - circ'd; testes normal    Impression:  1. Renal Failure  2. Retention    Plan:  1. Recommend continuing flomax 0.8 mg  2. Leave cheng for now  3. Will await disposition.  Would like to know if he has had PSA's as an outpatient.  Would need cysto at some point, whether with us or at the [facility]    -will follow

## 2023-04-24 NOTE — SIGNIFICANT NOTE
04/24/23 1341   OTHER   Discipline physical therapist   Rehab Time/Intention   Session Not Performed unable to evaluate, medical status change;other (see comments)  (now in full restraints per RN; hold PT today)   Recommendation   PT - Next Appointment 04/25/23

## 2023-04-24 NOTE — NURSING NOTE
Patient still has a lot of blood in cheng bag. STAT H&H ordered. Urology called to ask to have them come look at the patient to evaluate to see if three way irrigation would be needed. Dr. Carrillo came to bedisde, cysto was performed, and orders given for 4 point restraints. Urine is still blood tinged at this time. Will continue to monitor.

## 2023-04-25 ENCOUNTER — APPOINTMENT (OUTPATIENT)
Dept: GENERAL RADIOLOGY | Facility: HOSPITAL | Age: 63
End: 2023-04-25
Payer: MEDICARE

## 2023-04-25 VITALS
DIASTOLIC BLOOD PRESSURE: 85 MMHG | BODY MASS INDEX: 24.38 KG/M2 | WEIGHT: 151.68 LBS | RESPIRATION RATE: 27 BRPM | TEMPERATURE: 97.5 F | SYSTOLIC BLOOD PRESSURE: 116 MMHG | HEIGHT: 66 IN | HEART RATE: 73 BPM | OXYGEN SATURATION: 97 %

## 2023-04-25 LAB
ALBUMIN SERPL-MCNC: 2.5 G/DL (ref 3.5–5.2)
ALBUMIN/GLOB SERPL: 0.9 G/DL
ALP SERPL-CCNC: 170 U/L (ref 39–117)
ALT SERPL W P-5'-P-CCNC: 25 U/L (ref 1–41)
ANION GAP SERPL CALCULATED.3IONS-SCNC: 17 MMOL/L (ref 5–15)
AST SERPL-CCNC: 46 U/L (ref 1–40)
BASOPHILS # BLD AUTO: 0 10*3/MM3 (ref 0–0.2)
BASOPHILS NFR BLD AUTO: 0.3 % (ref 0–1.5)
BILIRUB SERPL-MCNC: 1 MG/DL (ref 0–1.2)
BUN SERPL-MCNC: 93 MG/DL (ref 8–23)
BUN/CREAT SERPL: 22.4 (ref 7–25)
CALCIUM SPEC-SCNC: 9.1 MG/DL (ref 8.6–10.5)
CHLORIDE SERPL-SCNC: 109 MMOL/L (ref 98–107)
CO2 SERPL-SCNC: 20 MMOL/L (ref 22–29)
CREAT SERPL-MCNC: 4.15 MG/DL (ref 0.76–1.27)
DEPRECATED RDW RBC AUTO: 63.4 FL (ref 37–54)
EGFRCR SERPLBLD CKD-EPI 2021: 15.1 ML/MIN/1.73
EOSINOPHIL # BLD AUTO: 0 10*3/MM3 (ref 0–0.4)
EOSINOPHIL NFR BLD AUTO: 0.4 % (ref 0.3–6.2)
ERYTHROCYTE [DISTWIDTH] IN BLOOD BY AUTOMATED COUNT: 19.2 % (ref 12.3–15.4)
GLOBULIN UR ELPH-MCNC: 2.9 GM/DL
GLUCOSE BLDC GLUCOMTR-MCNC: 128 MG/DL (ref 70–105)
GLUCOSE BLDC GLUCOMTR-MCNC: 55 MG/DL (ref 70–105)
GLUCOSE BLDC GLUCOMTR-MCNC: 88 MG/DL (ref 70–105)
GLUCOSE BLDC GLUCOMTR-MCNC: 97 MG/DL (ref 70–105)
GLUCOSE SERPL-MCNC: 48 MG/DL (ref 65–99)
HCT VFR BLD AUTO: 34.2 % (ref 37.5–51)
HGB BLD-MCNC: 10.4 G/DL (ref 13–17.7)
LYMPHOCYTES # BLD AUTO: 0.6 10*3/MM3 (ref 0.7–3.1)
LYMPHOCYTES NFR BLD AUTO: 12.5 % (ref 19.6–45.3)
MAGNESIUM SERPL-MCNC: 2.2 MG/DL (ref 1.6–2.4)
MCH RBC QN AUTO: 28.5 PG (ref 26.6–33)
MCHC RBC AUTO-ENTMCNC: 30.6 G/DL (ref 31.5–35.7)
MCV RBC AUTO: 93.2 FL (ref 79–97)
MONOCYTES # BLD AUTO: 0.3 10*3/MM3 (ref 0.1–0.9)
MONOCYTES NFR BLD AUTO: 6.4 % (ref 5–12)
NEUTROPHILS NFR BLD AUTO: 3.6 10*3/MM3 (ref 1.7–7)
NEUTROPHILS NFR BLD AUTO: 80.4 % (ref 42.7–76)
NRBC BLD AUTO-RTO: 1.6 /100 WBC (ref 0–0.2)
PHOSPHATE SERPL-MCNC: 4.2 MG/DL (ref 2.5–4.5)
PLATELET # BLD AUTO: 152 10*3/MM3 (ref 140–450)
PMV BLD AUTO: 9.7 FL (ref 6–12)
POTASSIUM SERPL-SCNC: 4.6 MMOL/L (ref 3.5–5.2)
PROT SERPL-MCNC: 5.4 G/DL (ref 6–8.5)
RBC # BLD AUTO: 3.67 10*6/MM3 (ref 4.14–5.8)
SODIUM SERPL-SCNC: 146 MMOL/L (ref 136–145)
TSH SERPL DL<=0.05 MIU/L-ACNC: 7.98 UIU/ML (ref 0.27–4.2)
WBC NRBC COR # BLD: 4.5 10*3/MM3 (ref 3.4–10.8)

## 2023-04-25 PROCEDURE — 80053 COMPREHEN METABOLIC PANEL: CPT | Performed by: NURSE PRACTITIONER

## 2023-04-25 PROCEDURE — 36415 COLL VENOUS BLD VENIPUNCTURE: CPT | Performed by: NURSE PRACTITIONER

## 2023-04-25 PROCEDURE — 85025 COMPLETE CBC W/AUTO DIFF WBC: CPT | Performed by: NURSE PRACTITIONER

## 2023-04-25 PROCEDURE — 84100 ASSAY OF PHOSPHORUS: CPT | Performed by: INTERNAL MEDICINE

## 2023-04-25 PROCEDURE — 82962 GLUCOSE BLOOD TEST: CPT

## 2023-04-25 PROCEDURE — 71045 X-RAY EXAM CHEST 1 VIEW: CPT

## 2023-04-25 PROCEDURE — 84443 ASSAY THYROID STIM HORMONE: CPT | Performed by: INTERNAL MEDICINE

## 2023-04-25 PROCEDURE — 99232 SBSQ HOSP IP/OBS MODERATE 35: CPT | Performed by: INTERNAL MEDICINE

## 2023-04-25 PROCEDURE — 83735 ASSAY OF MAGNESIUM: CPT | Performed by: INTERNAL MEDICINE

## 2023-04-25 RX ORDER — DEXTROSE MONOHYDRATE 25 G/50ML
25 INJECTION, SOLUTION INTRAVENOUS
Status: DISCONTINUED | OUTPATIENT
Start: 2023-04-25 | End: 2023-04-25 | Stop reason: HOSPADM

## 2023-04-25 RX ORDER — NICOTINE POLACRILEX 4 MG
15 LOZENGE BUCCAL
Status: DISCONTINUED | OUTPATIENT
Start: 2023-04-25 | End: 2023-04-25 | Stop reason: HOSPADM

## 2023-04-25 RX ORDER — IBUPROFEN 600 MG/1
1 TABLET ORAL
Status: DISCONTINUED | OUTPATIENT
Start: 2023-04-25 | End: 2023-04-25 | Stop reason: HOSPADM

## 2023-04-25 RX ADMIN — Medication 10 ML: at 08:08

## 2023-04-25 RX ADMIN — Medication 10 ML: at 07:59

## 2023-04-25 RX ADMIN — DEXTROSE MONOHYDRATE 25 G: 25 INJECTION, SOLUTION INTRAVENOUS at 07:58

## 2023-04-25 NOTE — PROGRESS NOTES
Referring Provider: Bernard Christensen MD    Reason for follow-up: Heart failure exacerbation, elevated troponin, cardiomyopathy     Patient Care Team:  Rigo De Los Santos MD as PCP - General (Family Medicine)      SUBJECTIVE  Somnolent, mumbles and unable to have a conversation     ROS  Review of all systems negative except as indicated.    Since I have last seen, the patient has been without any chest discomfort, shortness of breath, palpitations, dizziness or syncope.  Denies having any headache, abdominal pain, nausea, vomiting, diarrhea, constipation, loss of weight or loss of appetite.  Denies having any excessive bruising, hematuria or blood in the stool.  ROS      Personal History:    Past Medical History:   Diagnosis Date   • Adjustment disorder    • Alcohol dependence    • Anxiety    • Atrial fibrillation    • CHF (congestive heart failure)    • CKD (chronic kidney disease) stage 4, GFR 15-29 ml/min    • Dementia    • Depression    • Elevated cholesterol    • Encephalopathy    • GERD (gastroesophageal reflux disease)    • Hepatitis C    • Hyperlipidemia    • Hypertension        Past Surgical History:   Procedure Laterality Date   • PACEMAKER REPLACEMENT         History reviewed. No pertinent family history.    Social History     Tobacco Use   • Smoking status: Former     Types: Cigarettes   Vaping Use   • Vaping Use: Never used   Substance Use Topics   • Alcohol use: Not Currently   • Drug use: Not Currently     Types: Heroin        Medications Prior to Admission   Medication Sig Dispense Refill Last Dose   • albuterol sulfate  (90 Base) MCG/ACT inhaler Inhale 2 puffs Every 4 (Four) Hours As Needed for Wheezing.      • apixaban (ELIQUIS) 5 MG tablet tablet Take 1 tablet by mouth 2 (Two) Times a Day.      • atorvastatin (LIPITOR) 80 MG tablet Take 1 tablet by mouth Every Evening.      • buPROPion SR (WELLBUTRIN SR) 150 MG 12 hr tablet Take 1 tablet by mouth Daily.      • carvedilol (COREG) 6.25 MG  tablet Take 1 tablet by mouth 2 (Two) Times a Day With Meals.      • Cholecalciferol 25 MCG (1000 UT) tablet Take 2 tablets by mouth Daily.      • ferrous sulfate 325 (65 FE) MG tablet Take 1 tablet by mouth Daily With Breakfast.      • finasteride (PROSCAR) 5 MG tablet Take 1 tablet by mouth Daily.      • folic acid (FOLVITE) 1 MG tablet Take 1 tablet by mouth Daily.      • furosemide (LASIX) 40 MG tablet Take 1 tablet by mouth Daily.      • hydrALAZINE (APRESOLINE) 10 MG tablet Take 1 tablet by mouth 3 (Three) Times a Day.      • ipratropium-albuterol (DUO-NEB) 0.5-2.5 mg/3 ml nebulizer Take 3 mL by nebulization Every 4 (Four) Hours As Needed for Wheezing.      • isosorbide dinitrate (ISORDIL) 10 MG tablet Take 1 tablet by mouth 3 (Three) Times a Day.      • Lidocaine 4 % patch Apply 2 patches topically Every Morning.      • Menthol-Zinc Oxide (CALMOSEPTINE EX) Apply 1 application topically 2 (Two) Times a Day.      • mexiletine (MEXITIL) 150 MG capsule Take 1 capsule by mouth 3 (Three) Times a Day.      • naloxone (NARCAN) 4 MG/0.1ML nasal spray 1 spray into the nostril(s) as directed by provider As Needed.      • nitroglycerin (NITROLINGUAL) 0.4 MG/SPRAY spray Place 1 spray under the tongue Every 5 (Five) Minutes As Needed for Chest Pain.      • omeprazole (priLOSEC) 20 MG capsule Take 1 capsule by mouth Daily.      • polyethylene glycol (MIRALAX) 17 g packet Take 17 g by mouth Daily.      • sodium bicarbonate 650 MG tablet Take 2 tablets by mouth 2 (Two) Times a Day.      • tamsulosin (FLOMAX) 0.4 MG capsule 24 hr capsule Take 2 capsules by mouth Every Night.      • traZODone (DESYREL) 50 MG tablet Take 1 tablet by mouth Every Night.          Allergies:  Pork-derived products and Protonix [pantoprazole]    Scheduled Meds:apixaban, 5 mg, Oral, BID  atorvastatin, 80 mg, Oral, Q PM  buPROPion SR, 150 mg, Oral, Daily  cefTRIAXone, 1 g, Intravenous, Daily  ferrous sulfate, 324 mg, Oral, Daily With Breakfast  folic  "acid, 1 mg, Oral, Daily  metoprolol succinate XL, 12.5 mg, Oral, Q24H  mexiletine, 150 mg, Oral, TID  pantoprazole, 40 mg, Oral, Q AM  polyethylene glycol, 17 g, Oral, Daily  sodium bicarbonate, 1,300 mg, Oral, TID  sodium chloride, 10 mL, Intravenous, Q12H  tamsulosin, 0.8 mg, Oral, Nightly  traZODone, 50 mg, Oral, Nightly      Continuous Infusions:   PRN Meds:.•  acetaminophen **OR** acetaminophen **OR** acetaminophen  •  albuterol  •  dextrose  •  dextrose  •  glucagon (human recombinant)  •  hydrALAZINE  •  magnesium sulfate **OR** magnesium sulfate **OR** magnesium sulfate  •  melatonin  •  nitroglycerin  •  ondansetron **OR** ondansetron  •  potassium chloride  •  potassium chloride  •  sodium chloride  •  sodium chloride  •  sodium chloride  •  traMADol      OBJECTIVE    Vital Signs  Vitals:    04/25/23 0350 04/25/23 0424 04/25/23 0559 04/25/23 0731   BP: 92/60 106/84  119/78   BP Location:  Right arm  Left arm   Patient Position:  Lying  Lying   Pulse:    76   Resp:    25   Temp:   98.4 °F (36.9 °C) 97.5 °F (36.4 °C)   TempSrc:   Axillary Oral   SpO2:    95%   Weight:       Height:           Flowsheet Rows      Flowsheet Row First Filed Value   Admission Height 167.6 cm (66\") Documented at 04/22/2023 0555   Admission Weight 72.6 kg (160 lb) Documented at 04/22/2023 0555              Intake/Output Summary (Last 24 hours) at 4/25/2023 0905  Last data filed at 4/24/2023 0952  Gross per 24 hour   Intake 220 ml   Output --   Net 220 ml          Telemetry: Sinus rhythm    Physical Exam:  The patient is very somnolent and disoriented  Vital signs as noted above.  Head and neck revealed no carotid bruits or jugular venous distention.  No thyromegaly or lymphadenopathy is present  Lungs clear.  No wheezing.  Breath sounds are normal bilaterally.  Heart normal first and second heart sounds.  No murmur. No precordial rub is present.  No gallop is present.  Abdomen soft and nontender.  No organomegaly is " present.  Extremities with good peripheral pulses without any pedal edema.  Skin warm and dry.  Musculoskeletal system is grossly normal.  CNS grossly normal.       Results Review:  I have personally reviewed the results from the time of this admission to 4/25/2023 09:05 EDT and agree with these findings:  []  Laboratory  []  Microbiology  []  Radiology  []  EKG/Telemetry   []  Cardiology/Vascular   []  Pathology  []  Old records  []  Other:    Most notable findings include:    Lab Results (last 24 hours)       Procedure Component Value Units Date/Time    Blood Culture - Blood, Arm, Left [[id number]]  (Normal) Collected: 04/22/23 0825    Specimen: Blood from Arm, Left Updated: 04/25/23 0845     Blood Culture No growth at 3 days    Blood Culture - Blood, Arm, Left [[id number]]  (Normal) Collected: 04/22/23 0825    Specimen: Blood from Arm, Left Updated: 04/25/23 0830     Blood Culture No growth at 3 days    Magnesium [[id number]]  (Normal) Collected: 04/25/23 0610    Specimen: Blood Updated: 04/25/23 0823     Magnesium 2.2 mg/dL     Phosphorus [[id number]]  (Normal) Collected: 04/25/23 0610    Specimen: Blood Updated: 04/25/23 0823     Phosphorus 4.2 mg/dL     POC Glucose Once [[id number]]  (Abnormal) Collected: 04/25/23 0820    Specimen: Blood Updated: 04/25/23 0822     Glucose 128 mg/dL      Comment: Serial Number: [id number]:  [id number]       Comprehensive Metabolic Panel [[id number]]  (Abnormal) Collected: 04/25/23 0610    Specimen: Blood Updated: 04/25/23 0750     Glucose 48 mg/dL      BUN 93 mg/dL      Creatinine 4.15 mg/dL      Sodium 146 mmol/L      Potassium 4.6 mmol/L      Chloride 109 mmol/L      CO2 20.0 mmol/L      Calcium 9.1 mg/dL      Total Protein 5.4 g/dL      Albumin 2.5 g/dL      ALT (SGPT) 25 U/L      AST (SGOT) 46 U/L      Alkaline Phosphatase 170 U/L      Total Bilirubin 1.0 mg/dL      Globulin 2.9 gm/dL      A/G Ratio 0.9 g/dL      BUN/Creatinine Ratio 22.4     Anion Gap 17.0 mmol/L      eGFR  15.1 mL/min/1.73     Narrative:      GFR Normal >60  Chronic Kidney Disease <60  Kidney Failure <15      POC Glucose Once [[id number]]  (Abnormal) Collected: 04/25/23 0743    Specimen: Blood Updated: 04/25/23 0744     Glucose 55 mg/dL      Comment: Serial Number: [id number]:  [id number]       CBC Auto Differential [[id number]]  (Abnormal) Collected: 04/25/23 0610    Specimen: Blood Updated: 04/25/23 0718     WBC 4.50 10*3/mm3      RBC 3.67 10*6/mm3      Hemoglobin 10.4 g/dL      Hematocrit 34.2 %      MCV 93.2 fL      MCH 28.5 pg      MCHC 30.6 g/dL      RDW 19.2 %      RDW-SD 63.4 fl      MPV 9.7 fL      Platelets 152 10*3/mm3      Neutrophil % 80.4 %      Lymphocyte % 12.5 %      Monocyte % 6.4 %      Eosinophil % 0.4 %      Basophil % 0.3 %      Neutrophils, Absolute 3.60 10*3/mm3      Lymphocytes, Absolute 0.60 10*3/mm3      Monocytes, Absolute 0.30 10*3/mm3      Eosinophils, Absolute 0.00 10*3/mm3      Basophils, Absolute 0.00 10*3/mm3      nRBC 1.6 /100 WBC     Ammonia [[id number]]  (Abnormal) Collected: 04/24/23 1820    Specimen: Blood Updated: 04/24/23 1957     Ammonia <10 umol/L     Hemoglobin & Hematocrit, Blood [[id number]]  (Abnormal) Collected: 04/24/23 1820    Specimen: Blood Updated: 04/24/23 1835     Hemoglobin 11.1 g/dL      Hematocrit 36.9 %             Imaging Results (Last 24 Hours)       ** No results found for the last 24 hours. **            LAB RESULTS (LAST 7 DAYS)    CBC  Results from last 7 days   Lab Units 04/25/23  0610 04/24/23  1820 04/24/23  0302 04/23/23  0917 04/22/23  0618   WBC 10*3/mm3 4.50  --  3.80 4.00 5.00   RBC 10*6/mm3 3.67*  --  4.46 4.63 4.94   HEMOGLOBIN g/dL 10.4* 11.1* 12.7* 13.4 14.5   HEMATOCRIT % 34.2* 36.9* 40.9 43.7 45.4   MCV fL 93.2  --  91.9 94.4 91.7   PLATELETS 10*3/mm3 152  --  130* 160 169       BMP  Results from last 7 days   Lab Units 04/25/23  0610 04/24/23  0245 04/23/23  0917 04/22/23  0618   SODIUM mmol/L 146* 141 147* 146*   POTASSIUM mmol/L 4.6  5.0 4.3 4.5   CHLORIDE mmol/L 109* 107 110* 106   CO2 mmol/L 20.0* 16.0* 21.0* 22.0   BUN mg/dL 93* 80* 78* 76*   CREATININE mg/dL 4.15* 3.78* 3.53* 3.54*   GLUCOSE mg/dL 48* 81 105* 107*   MAGNESIUM mg/dL 2.2  --  2.3 2.2   PHOSPHORUS mg/dL 4.2  --  4.1 3.1       CMP   Results from last 7 days   Lab Units 04/25/23  0610 04/24/23  1820 04/24/23  0245 04/23/23  0917 04/22/23  0618   SODIUM mmol/L 146*  --  141 147* 146*   POTASSIUM mmol/L 4.6  --  5.0 4.3 4.5   CHLORIDE mmol/L 109*  --  107 110* 106   CO2 mmol/L 20.0*  --  16.0* 21.0* 22.0   BUN mg/dL 93*  --  80* 78* 76*   CREATININE mg/dL 4.15*  --  3.78* 3.53* 3.54*   GLUCOSE mg/dL 48*  --  81 105* 107*   ALBUMIN g/dL 2.5*  --  2.8* 3.0* 3.5   BILIRUBIN mg/dL 1.0  --  1.1 1.2 1.9*   ALK PHOS U/L 170*  --  199* 218* 265*   AST (SGOT) U/L 46*  --  41* 43* 52*   ALT (SGPT) U/L 25  --  25 27 33   AMMONIA umol/L  --  <10*  --   --   --        BNP        TROPONIN  Results from last 7 days   Lab Units 04/24/23  0245   HSTROP T ng/L 120*       CoAg        Creatinine Clearance  Estimated Creatinine Clearance: 17.3 mL/min (A) (by C-G formula based on SCr of 4.15 mg/dL (H)).    ABG        Radiology  XR Chest 1 View    Result Date: 4/24/2023  Impression: 1. Stable cardiomegaly. 2. No change in bilateral airspace disease. Electronically Signed: Carlitos Lovell  4/24/2023 7:37 AM EDT  Workstation ID: [id number]        EKG  I personally viewed and interpreted the patient's EKG/Telemetry data:  ECG 12 Lead Dyspnea   Final Result   HEART RATE= 88  bpm   RR Interval= 680  ms   AK Interval= 167  ms   P Horizontal Axis= 2  deg   P Front Axis= 49  deg   QRSD Interval= 110  ms   QT Interval= 433  ms   QRS Axis= -53  deg   T Wave Axis= 133  deg   - ABNORMAL ECG -   Sinus rhythm   LAD, consider left anterior fascicular block   Low voltage, extremity leads   Anteroseptal infarct, old   Nonspecific T abnormalities, lateral leads   Prolonged QT interval   No previous ECG available for  comparison   Electronically Signed By: Tony Deleon (Dennis) 22-Apr-2023 10:27:58   Date and Time of Study: 2023-04-22 06:25:45      SCANNED - TELEMETRY     Final Result      SCANNED - TELEMETRY     Final Result      SCANNED - TELEMETRY     Final Result      SCANNED - TELEMETRY     Final Result      SCANNED - TELEMETRY     Final Result      SCANNED - TELEMETRY     Final Result            Echocardiogram:    Results for orders placed during the hospital encounter of 04/22/23    Adult Transthoracic Echo Complete W/ Cont if Necessary Per Protocol    Interpretation Summary  •  Left ventricular systolic function is severely decreased. Calculated left ventricular EF = 15% Left ventricular ejection fraction appears to be less than 20%.  •  The left ventricular cavity is moderate to severely dilated.  •  Left ventricular diastolic dysfunction is noted.  •  Severely reduced right ventricular systolic function noted.  •  The right ventricular cavity is dilated.  •  The right atrial cavity is severely  dilated.  •  Severe mitral valve regurgitation is present.  •  Severe tricuspid valve regurgitation is present.  •  Estimated right ventricular systolic pressure from tricuspid regurgitation is markedly elevated (>55 mmHg).  •  Severe pulmonary hypertension is present.        Stress Test:         Cardiac Catheterization:  No results found for this or any previous visit.         Other:         ASSESSMENT & PLAN:    Principal Problem:    Multifocal pneumonia  Active Problems:    Acute on chronic systolic heart failure (CMS/HCC)    CHF exacerbation/HFrEF  Previous echocardiogram showed EF of 32% with end-diastolic dimension of 5 cm, severe pulmonary hypertension, severe TR RVSP 51 mmHg.  Previous cardiac catheterization in December 2022 showed nonobstructive coronary disease with slow flow phenomena.  Cardiac MRI with no evidence of amyloid  Restart Toprol-XL 25 mg p.o. daily  Unable to start ACE inhibitor, ARNI or SGLT2 due to renal  dysfunction.  GFR is less than 15  Previous attempts were made for afterload reduction with hydralazine and Isordil.  Blood pressure is too low for up titration of medications  Repeat echo shows EF of 20%, severe biventricular failure with severe mitral and tricuspid regurgitation with severe pulmonary hypertension  Start Lasix and consult nephrology  I recommend a right heart cath and initiating inotrope therapy versus hospice and palliative care consultation  Previous right heart cath showed cardiac index of 1.6  He has CardioMEMS in place  He also has ICD  Very poor prognosis     Pneumonia  Completed IV antibiotics for pneumonia  Oxygen supplementation as needed     Atrial fibrillation  Continue full dose anticoagulation with Eliquis 5 mg twice daily  Rate control with metoprolol XL     Hyperlipidemia  High intensity statin     Chronic kidney disease  Poor renal function with GFR of 15.  Avoid nephrotoxins.  Cardiorenal from poor cardiac output  Creatinine is 4.2 and BUN is 93  He may need hemodialysis/CRRT   Ongoing discussions regarding goals of care.  I strongly recommend palliative/hospice care for this patient     Neuropathy  Continue gabapentin  Previous fungal meningitis and alcohol dependence      Overall prognosis for this patient is very poor.    Ousmane Ho MD  04/25/23  09:05 EDT

## 2023-04-25 NOTE — CASE MANAGEMENT/SOCIAL WORK
"Physicians Statement of Medical Necessity for  Ambulance Transportation    GENERAL INFORMATION     Name: Seth Marie  YOB: 1960  Medicare #: [id number]  Transport Date: 4/25/2023  [facility]  [facility]  Is the Patient's stay covered under Medicare Part A (PPS/DRG?)Yes  Closest appropriate facility? Yes  If this a hosp-hosp transfer? Yes, describe services needed at 2nd facility not available at 1st facility Patient is a  and will receive [facility] related services not provided at [facility].  Is this a hospice patient? No    MEDICAL NECESSITY QUESTIONAIRE    Ambulance Transportation is medically necessary only if other means of transportation are contraindicated or would be potentially harmful to the patient.  To meet this requirement, the patient must be either \"bed confined\" or suffer from a condition such that transport by means other than an ambulance is contraindicated by the patient's condition.  The following questions must be answered by the healthcare professional signing below for this form to be valid:     1) Describe the MEDICAL CONDITION (physical and/or mental) of this patient AT THE TIME OF AMBULANCE TRANSPORT that requires the patient to be transported in an ambulance, and why transport by other means is contraindicated by the patient's condition:     Confusion, dementia, SOA, requiring 3L O2, pulling at lines/drains, 4x restraints, sitter.      Past Medical History:   Diagnosis Date   • Adjustment disorder    • Alcohol dependence    • Anxiety    • Atrial fibrillation    • CHF (congestive heart failure)    • CKD (chronic kidney disease) stage 4, GFR 15-29 ml/min    • Dementia    • Depression    • Elevated cholesterol    • Encephalopathy    • GERD (gastroesophageal reflux disease)    • Hepatitis C    • Hyperlipidemia    • Hypertension       Past Surgical History:   Procedure Laterality Date   • PACEMAKER REPLACEMENT          2) Is this patient " "\"bed confined\" as defined below?Yes   To be \"bed confined\" the patient must satisfy all three of the following criteria:  (1) unable to get up from bed without assistance; AND (2) unable to ambulate;  AND (3) unable to sit in a chair or wheelchair.  3) Can this patient safely be transported by car or wheelchair van (I.e., may safely sit during transport, without an attendant or monitoring?)No   4. In addition to completing questions 1-3 above, please check any of the following conditions that apply*:          *Note: supporting documentation for any boxes checked must be maintained in the patient's medical records Patient is confused, Need or possible need for restraints, Medical attendant required and Requires oxygen - unable to self administer      SIGNATURE OF PHYSICIAN OR OTHER AUTHORIZED HEALTHCARE PROFESSIONAL    I certify that the above information is true and correct based on my evaluation of this patient, and represent that the patient requires transport by ambulance and that other forms of transport are contraindicated.  I understand that this information will be used by the Centers for Medicare and Medicaid Services (CMS) to support the determiniation of medical necessity for ambulance services, and I represent that I have personal knowledge of the patient's condition at the time of transport.       If this box is checked, I also certify that the patient is physically or mentally incapable of signing the ambulance service's claim form and that the institution with which I am affiliated has furnished care, services or assistance to the patient.  My signature below is made on behalf of the patient pursuant to 42 .36(b)(4). In accordance with 42 .37, the specific reason(s) that the patient is physically or mentally incapable of signing the claim for is as follows:     Signature of Physician or Healthcare Professional    MEGAN NAEGELE RN Date/Time:     4/25/23 11:00     (For Scheduled repetitive " transport, this form is not valid for transports performed more than 60 days after this date).                                                                                                                     MEGAN NAEGELE RN                       --------------------------------------------------------------------------------------------  Printed Name and Credentials of Physician or Authorized Healthcare Professional     *Form must be signed by patient's attending physician for scheduled, repetitive transports,.  For non-repetitive ambulance transports, if unable to obtain the signature of the attending physician, any of the following may sign (please select below):     Physician  Clinical Nurse Specialist  X Registered Nurse     Physician Assistant  Discharge Planner  Licensed Practical Nurse     Nurse Practitioner  X

## 2023-04-25 NOTE — PLAN OF CARE
Goal Outcome Evaluation:              Outcome Evaluation: Patient remained in 4 point restraints all night with all failed attempts to removed.  Pt rectal temp was low at the start of shift and patient remained in a warming blanket until 2am.  Pt is confused.  Stable at this time.

## 2023-04-25 NOTE — PROGRESS NOTES
Urology Progress Note    Patient Identification:  Name:  Seth Marie  Age:  63 y.o.  Sex:  male  :  1960  MRN:  [id number]    Chief Complaint: Patient without complaint    History of Present Illness: Patient in four-point restraints.  This is evaluated keep him from pulling on the John catheter.  Catheter is draining bloody urine in the in the tubing it is more of a tea colored suggesting that the bleeding has stopped.    Problem List:    Multifocal pneumonia    Acute on chronic systolic heart failure (CMS/HCC)     Past Medical History:  Past Medical History:   Diagnosis Date   • Adjustment disorder    • Alcohol dependence    • Anxiety    • Atrial fibrillation    • CHF (congestive heart failure)    • CKD (chronic kidney disease) stage 4, GFR 15-29 ml/min    • Dementia    • Depression    • Elevated cholesterol    • Encephalopathy    • GERD (gastroesophageal reflux disease)    • Hepatitis C    • Hyperlipidemia    • Hypertension      Past Surgical History:  Past Surgical History:   Procedure Laterality Date   • PACEMAKER REPLACEMENT       Home Meds:  Medications Prior to Admission   Medication Sig Dispense Refill Last Dose   • albuterol sulfate  (90 Base) MCG/ACT inhaler Inhale 2 puffs Every 4 (Four) Hours As Needed for Wheezing.      • apixaban (ELIQUIS) 5 MG tablet tablet Take 1 tablet by mouth 2 (Two) Times a Day.      • atorvastatin (LIPITOR) 80 MG tablet Take 1 tablet by mouth Every Evening.      • buPROPion SR (WELLBUTRIN SR) 150 MG 12 hr tablet Take 1 tablet by mouth Daily.      • carvedilol (COREG) 6.25 MG tablet Take 1 tablet by mouth 2 (Two) Times a Day With Meals.      • Cholecalciferol 25 MCG (1000 UT) tablet Take 2 tablets by mouth Daily.      • ferrous sulfate 325 (65 FE) MG tablet Take 1 tablet by mouth Daily With Breakfast.      • finasteride (PROSCAR) 5 MG tablet Take 1 tablet by mouth Daily.      • folic acid (FOLVITE) 1 MG tablet Take 1 tablet by mouth Daily.      • furosemide  (LASIX) 40 MG tablet Take 1 tablet by mouth Daily.      • hydrALAZINE (APRESOLINE) 10 MG tablet Take 1 tablet by mouth 3 (Three) Times a Day.      • ipratropium-albuterol (DUO-NEB) 0.5-2.5 mg/3 ml nebulizer Take 3 mL by nebulization Every 4 (Four) Hours As Needed for Wheezing.      • isosorbide dinitrate (ISORDIL) 10 MG tablet Take 1 tablet by mouth 3 (Three) Times a Day.      • Lidocaine 4 % patch Apply 2 patches topically Every Morning.      • Menthol-Zinc Oxide (CALMOSEPTINE EX) Apply 1 application topically 2 (Two) Times a Day.      • mexiletine (MEXITIL) 150 MG capsule Take 1 capsule by mouth 3 (Three) Times a Day.      • naloxone (NARCAN) 4 MG/0.1ML nasal spray 1 spray into the nostril(s) as directed by provider As Needed.      • nitroglycerin (NITROLINGUAL) 0.4 MG/SPRAY spray Place 1 spray under the tongue Every 5 (Five) Minutes As Needed for Chest Pain.      • omeprazole (priLOSEC) 20 MG capsule Take 1 capsule by mouth Daily.      • polyethylene glycol (MIRALAX) 17 g packet Take 17 g by mouth Daily.      • sodium bicarbonate 650 MG tablet Take 2 tablets by mouth 2 (Two) Times a Day.      • tamsulosin (FLOMAX) 0.4 MG capsule 24 hr capsule Take 2 capsules by mouth Every Night.      • traZODone (DESYREL) 50 MG tablet Take 1 tablet by mouth Every Night.        Current Meds:    Current Facility-Administered Medications:   •  acetaminophen (TYLENOL) tablet 650 mg, 650 mg, Oral, Q4H PRN **OR** acetaminophen (TYLENOL) 160 MG/5ML solution 650 mg, 650 mg, Oral, Q4H PRN **OR** acetaminophen (TYLENOL) suppository 650 mg, 650 mg, Rectal, Q4H PRN, Miladis Batres APRN  •  albuterol (PROVENTIL) nebulizer solution 0.083% 2.5 mg/3mL, 2.5 mg, Nebulization, Q6H PRN, Bernard Christensen MD  •  apixaban (ELIQUIS) tablet 5 mg, 5 mg, Oral, BID, Miladis Batres APRN, 5 mg at 04/24/23 2151  •  atorvastatin (LIPITOR) tablet 80 mg, 80 mg, Oral, Q PM, Miladis Batres APRN, 80 mg at 04/24/23 1800  •  buPROPion SR (WELLBUTRIN SR) 12 hr tablet  150 mg, 150 mg, Oral, Daily, Miladis Batres APRN, 150 mg at 04/24/23 0838  •  ferrous sulfate EC tablet 324 mg, 324 mg, Oral, Daily With Breakfast, Miladis Batres APRN, 324 mg at 04/24/23 0838  •  folic acid (FOLVITE) tablet 1 mg, 1 mg, Oral, Daily, Miladis Batres APRN, 1 mg at 04/24/23 0838  •  hydrALAZINE (APRESOLINE) injection 10 mg, 10 mg, Intravenous, Q6H PRN, Miladis Batres APRN  •  Magnesium Sulfate - Total Dose 10 grams - Magnesium 1 or Less, 2 g, Intravenous, PRN **OR** Magnesium Sulfate - Total Dose 6 grams - Magnesium 1.1 - 1.5, 2 g, Intravenous, PRN **OR** Magnesium Sulfate - Total Dose 4 grams - Magnesium 1.6 - 1.9, 4 g, Intravenous, PRN, Bernard Christensen MD  •  melatonin tablet 5 mg, 5 mg, Oral, Nightly PRN, Miladis Batres APRN, 5 mg at 04/23/23 2123  •  metoprolol succinate XL (TOPROL-XL) 24 hr tablet 12.5 mg, 12.5 mg, Oral, Q24H, Miladis Batres APRN, 12.5 mg at 04/24/23 0838  •  mexiletine (MEXITIL) capsule 150 mg, 150 mg, Oral, TID, Miladis Batres APRN, 150 mg at 04/24/23 2151  •  nitroglycerin (NITROSTAT) SL tablet 0.4 mg, 0.4 mg, Sublingual, Q5 Min PRN, Miladis Batres APRN  •  ondansetron (ZOFRAN) tablet 4 mg, 4 mg, Oral, Q6H PRN **OR** ondansetron (ZOFRAN) injection 4 mg, 4 mg, Intravenous, Q6H PRN, Miladis Batres APRN  •  pantoprazole (PROTONIX) EC tablet 40 mg, 40 mg, Oral, Q AM, Miladis Batres APRN, 40 mg at 04/23/23 0517  •  polyethylene glycol (MIRALAX) packet 17 g, 17 g, Oral, Daily, Gisel, Miladis, APRN, 17 g at 04/24/23 0838  •  potassium chloride (K-DUR,KLOR-CON) CR tablet 40 mEq, 40 mEq, Oral, PRN, Bernard Christensen MD  •  potassium chloride (KLOR-CON) packet 40 mEq, 40 mEq, Oral, PRN, Bernard Christensen MD  •  sodium bicarbonate tablet 1,300 mg, 1,300 mg, Oral, TID, Tho Pires MD, 1,300 mg at 04/24/23 2151  •  sodium chloride 0.9 % flush 10 mL, 10 mL, Intravenous, PRN, Miladis Batres APRN  •  sodium chloride 0.9 % flush 10 mL, 10 mL, Intravenous, Q12H, Miladis Batres,  "APRN, 10 mL at 23  •  sodium chloride 0.9 % flush 10 mL, 10 mL, Intravenous, PRN, Miladis Batres, APRN  •  sodium chloride 0.9 % infusion 40 mL, 40 mL, Intravenous, PRN, Miladis Batres, APRN  •  tamsulosin (FLOMAX) 24 hr capsule 0.8 mg, 0.8 mg, Oral, Nightly, Gisel, Miladis, APRN, 0.8 mg at 23  •  traMADol (ULTRAM) tablet 50 mg, 50 mg, Oral, Q6H PRN, Gisel, Miladis, APRN, 50 mg at 23  •  traZODone (DESYREL) tablet 50 mg, 50 mg, Oral, Nightly, Gisel, Miladis, APRN, 50 mg at 23  Allergies:  Pork-derived products and Protonix [pantoprazole]    Review of Systems     Objective:  tMax 24 hours:  Temp (24hrs), Av.7 °F (35.9 °C), Min:90.3 °F (32.4 °C), Max:100.6 °F (38.1 °C)    Vital Sign Ranges:  Temp:  [90.3 °F (32.4 °C)-100.6 °F (38.1 °C)] 98.4 °F (36.9 °C)  Heart Rate:  [60-90] 88  Resp:  [16-25] 25  BP: ()/(60-94) 106/84  Intake and Output Last 3 Shifts:  I/O last 3 completed shifts:  In: 220 [P.O.:20; IV Piggyback:200]  Out: 400 [Urine:400]    Exam:  /84 (BP Location: Right arm, Patient Position: Lying)   Pulse 88   Temp 98.4 °F (36.9 °C) (Axillary)   Resp 25   Ht 167.6 cm (66\")   Wt 68.8 kg (151 lb 10.8 oz)   SpO2 93%   BMI 24.48 kg/m²    General Appearance:    Alert, cooperative, no acute distress, general         appearance is normal   Head:    Normocephalic, without obvious abnormality, atraumatic   Eyes:            Pupils/Irises normal. Exterior inspection conjunctivae       and lids normal.   Ears:    Normal external inspection   Nose:   Exterior inspection of nose is normal   Throat:   Lips, mucosa, and tongue normal   Lungs:     Respirations unlabored; normal effort, no audible     abnormality   CV:   Regular rhythm and normal rate, no edema   Abdomen:     examination of the abdomen is normal with     no masses, tenderness, or distension    :  John with dark tea colored urine     Data Review:  All labs (24hrs):    Lab Results (last 24 hours) "     Procedure Component Value Units Date/Time    Comprehensive Metabolic Panel [[id number]] Collected: 04/25/23 0610    Specimen: Blood Updated: 04/25/23 0708    CBC Auto Differential [[id number]] Collected: 04/25/23 0610    Specimen: Blood Updated: 04/25/23 0707    Ammonia [[id number]]  (Abnormal) Collected: 04/24/23 1820    Specimen: Blood Updated: 04/24/23 1957     Ammonia <10 umol/L     Hemoglobin & Hematocrit, Blood [[id number]]  (Abnormal) Collected: 04/24/23 1820    Specimen: Blood Updated: 04/24/23 1835     Hemoglobin 11.1 g/dL      Hematocrit 36.9 %     Blood Culture - Blood, Arm, Left [[id number]]  (Normal) Collected: 04/22/23 0825    Specimen: Blood from Arm, Left Updated: 04/24/23 0830     Blood Culture No growth at 2 days    Blood Culture - Blood, Arm, Left [[id number]]  (Normal) Collected: 04/22/23 0825    Specimen: Blood from Arm, Left Updated: 04/24/23 0830     Blood Culture No growth at 2 days        Radiology:   Imaging Results (Last 72 Hours)     Procedure Component Value Units Date/Time    XR Chest 1 View [[id number]] Collected: 04/24/23 0736     Updated: 04/24/23 0740    Narrative:      XR CHEST 1 VW    Date of Exam: 4/24/2023 4:00 AM EDT    Indication: pulmonary edema  post op    Comparison: 4/23/2023    Findings:  Left subclavian transvenous pacemaker remains in place unchanged in position. Cardiomegaly is stable. There is been no change in bilateral airspace disease. No definite pleural effusion. No evidence of pneumothorax.     Impression:      Impression:    1. Stable cardiomegaly.  2. No change in bilateral airspace disease.      Electronically Signed: Carlitos Lovell    4/24/2023 7:37 AM EDT    Workstation ID: [id number]    XR Chest 1 View [[id number]] Collected: 04/23/23 0845     Updated: 04/23/23 0849    Narrative:      XR CHEST 1 VW    Date of Exam: 4/23/2023 6:03 AM EDT    Indication: fluid overload    Comparison: 4/22/2023    Findings:  Left chest wall pacemaker is present. Cardiac silhouette is  moderately enlarged. Pulmonary vasculature is indistinct. Bilateral airspace disease may represent edema or multifocal infiltrates. No significant pleural effusion or pneumothorax is seen. No   acute osseous lesion is seen.      Impression:      Impression:  Cardiomegaly with bilateral airspace disease which may represent edema or multifocal infiltrates. Findings are similar or slightly improved since 4/22/2023.      Electronically Signed: Mohinder Pérez    4/23/2023 8:47 AM EDT    Workstation ID: [id number]    US Renal Bilateral [[id number]] Collected: 04/22/23 1543     Updated: 04/22/23 1547    Narrative:      US RENAL BILATERAL    Date of Exam: 4/22/2023 3:24 PM EDT    Indication: BRET.    Comparison: No comparisons available.    Technique: Grayscale and color Doppler ultrasound evaluation of the kidneys and urinary bladder was performed.      Findings:  Technologist noted a technically difficult exam due to patient condition.    Right kidney measures 9.1 x 5.1 x 6.7 cm, and left kidney measures 9.6 x 5.2 x 5.6 cm. Renal cortical echogenicity appears increased. No hydronephrosis or obvious renal mass is seen.    Urinary bladder is incompletely distended with an estimated volume of 68.5 mL.    Ascites is incidentally noted.      Impression:      Impression:  1.Increased renal cortical echogenicity, which can be seen with chronic medical renal disease.  2.No hydronephrosis.  3.Incidental ascites.        Electronically Signed: Emily Webster    4/22/2023 3:45 PM EDT    Workstation ID: [id number]    XR Chest 1 View [[id number]] Collected: 04/22/23 0732     Updated: 04/22/23 0735    Narrative:      XR CHEST 1 VW    Date of Exam: 4/22/2023 6:43 AM EDT    Indication: CHF/COPD Protocol    Comparison: None available.    Findings:  There are multifocal airspace opacities throughout both lungs, most confluent in the right lower lung. No pneumothorax or large pleural effusion is seen. Cardiac silhouette is enlarged. Pacemaker is  noted.      Impression:      Impression:  Patchy airspace opacities throughout both lungs, concerning for multifocal pneumonia and/or pulmonary edema.      Electronically Signed: Emily Darin    4/22/2023 7:33 AM EDT    Workstation ID: [id number]          Assessment/Plan:    Principal Problem:    Multifocal pneumonia  Active Problems:    Acute on chronic systolic heart failure (CMS/HCC)     Urinary retention  BPH with lower urinary tract symptoms exacerbated by weakness of acute illness  Gross hematuria due to John trauma.  Patient pulled out catheter himself.    Plan  Continue John catheter until early next week to allow urethra to heal  Restrain patient as necessary to keep from from pulling on the catheter      Wero Haines MD  4/25/2023  07:16 EDT

## 2023-04-25 NOTE — CASE MANAGEMENT/SOCIAL WORK
Continued Stay Note  [facility]     Patient Name: Seth Marie  MRN: [id number]  Today's Date: 4/25/2023    Admit Date: 4/22/2023    [facility]   Discharge Plan     Row Name 04/25/23 1154       Plan    Plan Transfer to [facility].    Patient/Family in Agreement with Plan yes    Plan Comments CM received [facility] verifying pt had restraints and/or sitter. Confirmed with nursing that pt was in 4-point restraints and sitter at bedside. Received follow up phone call from [facility] that pt has room assigned- [address] [place] [address], room #485. Nursing to call report to [phone]. Secure chat sent to care team with information. MATT called [facility] back to see if [facility] for [facility] but they do not. PCS form completed by MATT and unit secretary contacted [facility]. Updated [place] liaison Teal that pt is transferring.                Megan Naegele, RN      Office Phone: [phone]  Office Cell: [phone]

## 2023-04-25 NOTE — PROGRESS NOTES
"Enter Query Response Below      Query Response: CHF secondary to nonischemic cardiomyopathy leading to severe secondary mitral regurgitation.  Severe functional mitral regurgitation is secondary to cardiomyopathy.    Electronically signed by Ousmane Ho MD, 23, 5:05 PM EDT.               If applicable, please update the problem list.   Patient: Seth Marie        : 1960  Account: [id number]           Admit Date: 2023        How to Respond to this query:       a. Click New Note     b. Answer query within the yellow box.                c. Update the Problem List, if applicable.      If you have any questions about this query contact me at: [phone]    Dr. Ho:    63-year-old patient admitted  with pneumonia and acute on chronic systolic CHF has history of hypertension and nonischemic cardiomyopathy. Cardiology PN  states \"Repeat echo shows EF of 20%, severe biventricular failure with severe mitral and tricuspid regurgitation with severe pulmonary hypertension\".   Treatment has included Bumex IV, supplemental oxygen, monitoring intake and output, Toprol-XL, and transfer to [facility] per family request.   After study, can the etiology of systolic heart failure be further specified as:    CHF secondary to cardiomyopathy  CHF secondary to severe mitral regurgitation  CHF secondary to hypertension  CHF secondary to hypertension, cardiomyopathy, and severe mitral regurgitation  Other, please specify: ______________  Unable to clinically determine     By submitting this query, we are merely seeking further clarification of documentation to accurately reflect all conditions that you are monitoring, evaluating, treating or that extend the hospitalization or utilize additional resources of care. Please utilize your independent clinical judgment when addressing the question(s) above.     This query and your response, once completed, will be entered into the legal medical " record.    Sincerely,  Danii Noguera RN, BSN, CCDS  Clinical Documentation Integrity Program   [email or web address]

## 2023-04-25 NOTE — CASE MANAGEMENT/SOCIAL WORK
Case Management Discharge Note      Final Note: [facility].         Selected Continued Care - Discharged on 4/25/2023 Admission date: 4/22/2023 - Discharge disposition: Hospice/Medical Facility (DC - External)     Transportation Services  Ambulance: [facility]    Final Discharge Disposition Code: 02 - short [facility] hospital for  care

## 2023-04-25 NOTE — SIGNIFICANT NOTE
04/25/23 0936   Rehab Time/Intention   Session Not Performed unable to evaluate, medical status change;other (see comments)  (Pt restless, has pulled out multiple catheters and RN reported not appropriate for therapy services at this time. Will f/u 4/26 as appropriate.)   Recommendation   PT - Next Appointment 04/26/23

## 2023-04-27 LAB
BACTERIA SPEC AEROBE CULT: NORMAL
BACTERIA SPEC AEROBE CULT: NORMAL
